# Patient Record
Sex: FEMALE | Race: WHITE | NOT HISPANIC OR LATINO | Employment: FULL TIME | URBAN - METROPOLITAN AREA
[De-identification: names, ages, dates, MRNs, and addresses within clinical notes are randomized per-mention and may not be internally consistent; named-entity substitution may affect disease eponyms.]

---

## 2022-05-03 ENCOUNTER — HOSPITAL ENCOUNTER (OUTPATIENT)
Facility: MEDICAL CENTER | Age: 37
End: 2022-05-03
Attending: OBSTETRICS & GYNECOLOGY
Payer: COMMERCIAL

## 2022-05-03 PROCEDURE — 88175 CYTOPATH C/V AUTO FLUID REDO: CPT

## 2022-05-03 PROCEDURE — 87591 N.GONORRHOEAE DNA AMP PROB: CPT

## 2022-05-03 PROCEDURE — 87491 CHLMYD TRACH DNA AMP PROBE: CPT

## 2022-05-05 LAB
C TRACH DNA GENITAL QL NAA+PROBE: NEGATIVE
CYTOLOGY REG CYTOL: NORMAL
N GONORRHOEA DNA GENITAL QL NAA+PROBE: NEGATIVE
SPECIMEN SOURCE: NORMAL

## 2022-05-13 ENCOUNTER — HOSPITAL ENCOUNTER (OUTPATIENT)
Facility: MEDICAL CENTER | Age: 37
End: 2022-05-13
Attending: OBSTETRICS & GYNECOLOGY
Payer: COMMERCIAL

## 2022-05-13 PROCEDURE — 87086 URINE CULTURE/COLONY COUNT: CPT

## 2022-05-13 PROCEDURE — 87389 HIV-1 AG W/HIV-1&-2 AB AG IA: CPT

## 2022-05-13 PROCEDURE — 82570 ASSAY OF URINE CREATININE: CPT

## 2022-05-13 PROCEDURE — 86762 RUBELLA ANTIBODY: CPT

## 2022-05-13 PROCEDURE — 84156 ASSAY OF PROTEIN URINE: CPT

## 2022-05-13 PROCEDURE — 86850 RBC ANTIBODY SCREEN: CPT

## 2022-05-13 PROCEDURE — 86780 TREPONEMA PALLIDUM: CPT

## 2022-05-13 PROCEDURE — 36415 COLL VENOUS BLD VENIPUNCTURE: CPT

## 2022-05-13 PROCEDURE — 80053 COMPREHEN METABOLIC PANEL: CPT

## 2022-05-13 PROCEDURE — 86900 BLOOD TYPING SEROLOGIC ABO: CPT

## 2022-05-13 PROCEDURE — 86803 HEPATITIS C AB TEST: CPT

## 2022-05-13 PROCEDURE — 86901 BLOOD TYPING SEROLOGIC RH(D): CPT

## 2022-05-13 PROCEDURE — 87340 HEPATITIS B SURFACE AG IA: CPT

## 2022-05-13 PROCEDURE — 86886 COOMBS TEST INDIRECT TITER: CPT

## 2022-05-16 LAB
ABO GROUP BLD: NORMAL
ALBUMIN SERPL BCP-MCNC: 4.1 G/DL (ref 3.2–4.9)
ALBUMIN/GLOB SERPL: 1.2 G/DL
ALP SERPL-CCNC: 142 U/L (ref 30–99)
ALT SERPL-CCNC: 279 U/L (ref 2–50)
ANION GAP SERPL CALC-SCNC: 15 MMOL/L (ref 7–16)
AST SERPL-CCNC: 139 U/L (ref 12–45)
BILIRUB SERPL-MCNC: 0.3 MG/DL (ref 0.1–1.5)
BLD GP AB SCN SERPL QL: NORMAL
BUN SERPL-MCNC: 8 MG/DL (ref 8–22)
CALCIUM SERPL-MCNC: 9.7 MG/DL (ref 8.5–10.5)
CHLORIDE SERPL-SCNC: 101 MMOL/L (ref 96–112)
CO2 SERPL-SCNC: 21 MMOL/L (ref 20–33)
CREAT SERPL-MCNC: 0.7 MG/DL (ref 0.5–1.4)
CREAT UR-MCNC: 8.13 MG/DL
GFR SERPLBLD CREATININE-BSD FMLA CKD-EPI: 113 ML/MIN/1.73 M 2
GLOBULIN SER CALC-MCNC: 3.5 G/DL (ref 1.9–3.5)
GLUCOSE SERPL-MCNC: 50 MG/DL (ref 65–99)
HBV SURFACE AG SER QL: NORMAL
HCV AB SER QL: NORMAL
HIV 1+2 AB+HIV1 P24 AG SERPL QL IA: NORMAL
POTASSIUM SERPL-SCNC: 4.1 MMOL/L (ref 3.6–5.5)
PROT SERPL-MCNC: 7.6 G/DL (ref 6–8.2)
PROT UR-MCNC: <4 MG/DL (ref 0–15)
PROT/CREAT UR: NORMAL MG/G (ref 10–107)
RH BLD: NORMAL
RUBV AB SER QL: 284 IU/ML
SODIUM SERPL-SCNC: 137 MMOL/L (ref 135–145)
T PALLIDUM AB SER QL IA: NORMAL

## 2022-05-18 LAB
BACTERIA UR CULT: NORMAL
SIGNIFICANT IND 70042: NORMAL
SITE SITE: NORMAL
SOURCE SOURCE: NORMAL

## 2022-06-24 ENCOUNTER — HOSPITAL ENCOUNTER (OUTPATIENT)
Dept: LAB | Facility: MEDICAL CENTER | Age: 37
End: 2022-06-24
Attending: OBSTETRICS & GYNECOLOGY
Payer: COMMERCIAL

## 2022-06-24 LAB
ALBUMIN SERPL BCP-MCNC: 3.8 G/DL (ref 3.2–4.9)
ALBUMIN/GLOB SERPL: 1.3 G/DL
ALP SERPL-CCNC: 37 U/L (ref 30–99)
ALT SERPL-CCNC: 28 U/L (ref 2–50)
ANION GAP SERPL CALC-SCNC: 13 MMOL/L (ref 7–16)
AST SERPL-CCNC: 23 U/L (ref 12–45)
BILIRUB SERPL-MCNC: 0.2 MG/DL (ref 0.1–1.5)
BUN SERPL-MCNC: 11 MG/DL (ref 8–22)
CALCIUM SERPL-MCNC: 9.2 MG/DL (ref 8.5–10.5)
CHLORIDE SERPL-SCNC: 103 MMOL/L (ref 96–112)
CO2 SERPL-SCNC: 21 MMOL/L (ref 20–33)
CREAT SERPL-MCNC: 0.64 MG/DL (ref 0.5–1.4)
GFR SERPLBLD CREATININE-BSD FMLA CKD-EPI: 116 ML/MIN/1.73 M 2
GLOBULIN SER CALC-MCNC: 3 G/DL (ref 1.9–3.5)
GLUCOSE SERPL-MCNC: 86 MG/DL (ref 65–99)
POTASSIUM SERPL-SCNC: 3.6 MMOL/L (ref 3.6–5.5)
PROT SERPL-MCNC: 6.8 G/DL (ref 6–8.2)
SODIUM SERPL-SCNC: 137 MMOL/L (ref 135–145)

## 2022-06-24 PROCEDURE — 36415 COLL VENOUS BLD VENIPUNCTURE: CPT

## 2022-06-24 PROCEDURE — 80053 COMPREHEN METABOLIC PANEL: CPT

## 2022-06-30 ENCOUNTER — HOSPITAL ENCOUNTER (OUTPATIENT)
Facility: MEDICAL CENTER | Age: 37
End: 2022-06-30
Attending: OBSTETRICS & GYNECOLOGY
Payer: COMMERCIAL

## 2022-06-30 PROCEDURE — 82105 ALPHA-FETOPROTEIN SERUM: CPT

## 2022-06-30 PROCEDURE — 36415 COLL VENOUS BLD VENIPUNCTURE: CPT

## 2022-07-03 LAB
# FETUSES US: NORMAL
AFP MOM SERPL: 0.91
AFP SERPL-MCNC: 31 NG/ML
AGE - REPORTED: 38.8 YR
CURRENT SMOKER: NO
FAMILY MEMBER DISEASES HX: NO
GA METHOD: NORMAL
GA: NORMAL WK
IDDM PATIENT QL: NO
INTEGRATED SCN PATIENT-IMP: NORMAL
SPECIMEN DRAWN SERPL: NORMAL

## 2022-08-16 ENCOUNTER — HOSPITAL ENCOUNTER (OUTPATIENT)
Dept: LAB | Facility: MEDICAL CENTER | Age: 37
End: 2022-08-16
Attending: OBSTETRICS & GYNECOLOGY
Payer: COMMERCIAL

## 2022-08-16 LAB
GLUCOSE 1H P 50 G GLC PO SERPL-MCNC: 148 MG/DL (ref 70–139)
HCT VFR BLD AUTO: 36.2 % (ref 37–47)
HGB BLD-MCNC: 12.4 G/DL (ref 12–16)
PLATELET # BLD AUTO: 194 K/UL (ref 164–446)
T PALLIDUM AB SER QL IA: NORMAL

## 2022-08-16 PROCEDURE — 86780 TREPONEMA PALLIDUM: CPT

## 2022-08-16 PROCEDURE — 85014 HEMATOCRIT: CPT

## 2022-08-16 PROCEDURE — 82950 GLUCOSE TEST: CPT

## 2022-08-16 PROCEDURE — 36415 COLL VENOUS BLD VENIPUNCTURE: CPT

## 2022-08-16 PROCEDURE — 85049 AUTOMATED PLATELET COUNT: CPT

## 2022-08-16 PROCEDURE — 85018 HEMOGLOBIN: CPT

## 2022-08-31 ENCOUNTER — HOSPITAL ENCOUNTER (OUTPATIENT)
Dept: LAB | Facility: MEDICAL CENTER | Age: 37
End: 2022-08-31
Attending: OBSTETRICS & GYNECOLOGY
Payer: COMMERCIAL

## 2022-08-31 LAB
GLUCOSE 1H P CHAL SERPL-MCNC: 141 MG/DL (ref 65–180)
GLUCOSE 2H P CHAL SERPL-MCNC: 129 MG/DL (ref 65–155)
GLUCOSE 3H P CHAL SERPL-MCNC: 101 MG/DL (ref 65–140)
GLUCOSE BS SERPL-MCNC: 87 MG/DL (ref 65–95)

## 2022-08-31 PROCEDURE — 82951 GLUCOSE TOLERANCE TEST (GTT): CPT

## 2022-08-31 PROCEDURE — 36415 COLL VENOUS BLD VENIPUNCTURE: CPT

## 2022-08-31 PROCEDURE — 82952 GTT-ADDED SAMPLES: CPT

## 2022-10-20 ENCOUNTER — HOSPITAL ENCOUNTER (OUTPATIENT)
Dept: LAB | Facility: MEDICAL CENTER | Age: 37
End: 2022-10-20
Attending: OBSTETRICS & GYNECOLOGY

## 2022-10-20 ENCOUNTER — HOSPITAL ENCOUNTER (OUTPATIENT)
Facility: MEDICAL CENTER | Age: 37
End: 2022-10-20
Attending: OBSTETRICS & GYNECOLOGY
Payer: COMMERCIAL

## 2022-10-20 PROCEDURE — 87150 DNA/RNA AMPLIFIED PROBE: CPT

## 2022-10-20 PROCEDURE — 87081 CULTURE SCREEN ONLY: CPT

## 2022-10-21 LAB — GP B STREP DNA SPEC QL NAA+PROBE: NEGATIVE

## 2022-11-26 ENCOUNTER — ANESTHESIA EVENT (OUTPATIENT)
Dept: OBGYN | Facility: MEDICAL CENTER | Age: 37
End: 2022-11-26
Payer: COMMERCIAL

## 2022-11-26 ENCOUNTER — ANESTHESIA (OUTPATIENT)
Dept: OBGYN | Facility: MEDICAL CENTER | Age: 37
End: 2022-11-26
Payer: COMMERCIAL

## 2022-11-26 ENCOUNTER — APPOINTMENT (OUTPATIENT)
Dept: OBGYN | Facility: MEDICAL CENTER | Age: 37
End: 2022-11-26
Attending: OBSTETRICS & GYNECOLOGY
Payer: COMMERCIAL

## 2022-11-26 ENCOUNTER — APPOINTMENT (OUTPATIENT)
Dept: RADIOLOGY | Facility: MEDICAL CENTER | Age: 37
End: 2022-11-26
Attending: OBSTETRICS & GYNECOLOGY
Payer: COMMERCIAL

## 2022-11-26 ENCOUNTER — HOSPITAL ENCOUNTER (INPATIENT)
Facility: MEDICAL CENTER | Age: 37
LOS: 2 days | End: 2022-11-28
Attending: OBSTETRICS & GYNECOLOGY | Admitting: OBSTETRICS & GYNECOLOGY
Payer: COMMERCIAL

## 2022-11-26 DIAGNOSIS — G89.18 POST-OP PAIN: ICD-10-CM

## 2022-11-26 DIAGNOSIS — Z91.89 AT RISK FOR BREASTFEEDING DIFFICULTY: Primary | ICD-10-CM

## 2022-11-26 LAB
ALBUMIN SERPL BCP-MCNC: 3.5 G/DL (ref 3.2–4.9)
ALBUMIN/GLOB SERPL: 1.1 G/DL
ALP SERPL-CCNC: 73 U/L (ref 30–99)
ALT SERPL-CCNC: 18 U/L (ref 2–50)
ANION GAP SERPL CALC-SCNC: 13 MMOL/L (ref 7–16)
APTT PPP: 26.7 SEC (ref 24.7–36)
AST SERPL-CCNC: 32 U/L (ref 12–45)
BASOPHILS # BLD AUTO: 0.4 % (ref 0–1.8)
BASOPHILS # BLD: 0.03 K/UL (ref 0–0.12)
BILIRUB SERPL-MCNC: 0.2 MG/DL (ref 0.1–1.5)
BUN SERPL-MCNC: 10 MG/DL (ref 8–22)
CALCIUM SERPL-MCNC: 9.1 MG/DL (ref 8.5–10.5)
CHLORIDE SERPL-SCNC: 103 MMOL/L (ref 96–112)
CO2 SERPL-SCNC: 21 MMOL/L (ref 20–33)
CREAT SERPL-MCNC: 0.68 MG/DL (ref 0.5–1.4)
CREAT UR-MCNC: 45.38 MG/DL
EOSINOPHIL # BLD AUTO: 0.03 K/UL (ref 0–0.51)
EOSINOPHIL NFR BLD: 0.4 % (ref 0–6.9)
ERYTHROCYTE [DISTWIDTH] IN BLOOD BY AUTOMATED COUNT: 45.5 FL (ref 35.9–50)
GFR SERPLBLD CREATININE-BSD FMLA CKD-EPI: 114 ML/MIN/1.73 M 2
GLOBULIN SER CALC-MCNC: 3.2 G/DL (ref 1.9–3.5)
GLUCOSE SERPL-MCNC: 109 MG/DL (ref 65–99)
HCT VFR BLD AUTO: 40.4 % (ref 37–47)
HGB BLD-MCNC: 13.6 G/DL (ref 12–16)
HOLDING TUBE BB 8507: NORMAL
IMM GRANULOCYTES # BLD AUTO: 0.03 K/UL (ref 0–0.11)
IMM GRANULOCYTES NFR BLD AUTO: 0.4 % (ref 0–0.9)
INR PPP: 0.9 (ref 0.87–1.13)
LYMPHOCYTES # BLD AUTO: 1.25 K/UL (ref 1–4.8)
LYMPHOCYTES NFR BLD: 18 % (ref 22–41)
MCH RBC QN AUTO: 30.7 PG (ref 27–33)
MCHC RBC AUTO-ENTMCNC: 33.7 G/DL (ref 33.6–35)
MCV RBC AUTO: 91.2 FL (ref 81.4–97.8)
MONOCYTES # BLD AUTO: 0.56 K/UL (ref 0–0.85)
MONOCYTES NFR BLD AUTO: 8.1 % (ref 0–13.4)
NEUTROPHILS # BLD AUTO: 5.03 K/UL (ref 2–7.15)
NEUTROPHILS NFR BLD: 72.7 % (ref 44–72)
NRBC # BLD AUTO: 0 K/UL
NRBC BLD-RTO: 0 /100 WBC
PLATELET # BLD AUTO: 148 K/UL (ref 164–446)
PMV BLD AUTO: 13.1 FL (ref 9–12.9)
POTASSIUM SERPL-SCNC: 4.2 MMOL/L (ref 3.6–5.5)
PROT SERPL-MCNC: 6.7 G/DL (ref 6–8.2)
PROT UR-MCNC: 8 MG/DL (ref 0–15)
PROT/CREAT UR: 176 MG/G (ref 10–107)
PROTHROMBIN TIME: 12.1 SEC (ref 12–14.6)
RBC # BLD AUTO: 4.43 M/UL (ref 4.2–5.4)
SODIUM SERPL-SCNC: 137 MMOL/L (ref 135–145)
T PALLIDUM AB SER QL IA: NORMAL
URATE SERPL-MCNC: 5 MG/DL (ref 1.9–8.2)
WBC # BLD AUTO: 6.9 K/UL (ref 4.8–10.8)

## 2022-11-26 PROCEDURE — 85610 PROTHROMBIN TIME: CPT

## 2022-11-26 PROCEDURE — 160035 HCHG PACU - 1ST 60 MINS PHASE I: Performed by: OBSTETRICS & GYNECOLOGY

## 2022-11-26 PROCEDURE — 700111 HCHG RX REV CODE 636 W/ 250 OVERRIDE (IP)

## 2022-11-26 PROCEDURE — 700101 HCHG RX REV CODE 250: Performed by: ANESTHESIOLOGY

## 2022-11-26 PROCEDURE — 36415 COLL VENOUS BLD VENIPUNCTURE: CPT

## 2022-11-26 PROCEDURE — 84550 ASSAY OF BLOOD/URIC ACID: CPT

## 2022-11-26 PROCEDURE — 160048 HCHG OR STATISTICAL LEVEL 1-5: Performed by: OBSTETRICS & GYNECOLOGY

## 2022-11-26 PROCEDURE — 160036 HCHG PACU - EA ADDL 30 MINS PHASE I: Performed by: OBSTETRICS & GYNECOLOGY

## 2022-11-26 PROCEDURE — A9270 NON-COVERED ITEM OR SERVICE: HCPCS | Performed by: ANESTHESIOLOGY

## 2022-11-26 PROCEDURE — A9270 NON-COVERED ITEM OR SERVICE: HCPCS | Performed by: OBSTETRICS & GYNECOLOGY

## 2022-11-26 PROCEDURE — 700105 HCHG RX REV CODE 258: Performed by: ANESTHESIOLOGY

## 2022-11-26 PROCEDURE — C1755 CATHETER, INTRASPINAL: HCPCS | Performed by: OBSTETRICS & GYNECOLOGY

## 2022-11-26 PROCEDURE — 700101 HCHG RX REV CODE 250

## 2022-11-26 PROCEDURE — 160041 HCHG SURGERY MINUTES - EA ADDL 1 MIN LEVEL 4: Performed by: OBSTETRICS & GYNECOLOGY

## 2022-11-26 PROCEDURE — 160002 HCHG RECOVERY MINUTES (STAT): Performed by: OBSTETRICS & GYNECOLOGY

## 2022-11-26 PROCEDURE — 160029 HCHG SURGERY MINUTES - 1ST 30 MINS LEVEL 4: Performed by: OBSTETRICS & GYNECOLOGY

## 2022-11-26 PROCEDURE — 700102 HCHG RX REV CODE 250 W/ 637 OVERRIDE(OP): Performed by: ANESTHESIOLOGY

## 2022-11-26 PROCEDURE — 700111 HCHG RX REV CODE 636 W/ 250 OVERRIDE (IP): Performed by: ANESTHESIOLOGY

## 2022-11-26 PROCEDURE — 86780 TREPONEMA PALLIDUM: CPT

## 2022-11-26 PROCEDURE — 84156 ASSAY OF PROTEIN URINE: CPT

## 2022-11-26 PROCEDURE — 700102 HCHG RX REV CODE 250 W/ 637 OVERRIDE(OP): Performed by: OBSTETRICS & GYNECOLOGY

## 2022-11-26 PROCEDURE — 80053 COMPREHEN METABOLIC PANEL: CPT

## 2022-11-26 PROCEDURE — 85025 COMPLETE CBC W/AUTO DIFF WBC: CPT

## 2022-11-26 PROCEDURE — 770002 HCHG ROOM/CARE - OB PRIVATE (112)

## 2022-11-26 PROCEDURE — 82570 ASSAY OF URINE CREATININE: CPT

## 2022-11-26 PROCEDURE — 01961 ANES CESAREAN DELIVERY ONLY: CPT | Performed by: ANESTHESIOLOGY

## 2022-11-26 PROCEDURE — 85730 THROMBOPLASTIN TIME PARTIAL: CPT

## 2022-11-26 PROCEDURE — 3E0P7VZ INTRODUCTION OF HORMONE INTO FEMALE REPRODUCTIVE, VIA NATURAL OR ARTIFICIAL OPENING: ICD-10-PCS | Performed by: OBSTETRICS & GYNECOLOGY

## 2022-11-26 PROCEDURE — 700111 HCHG RX REV CODE 636 W/ 250 OVERRIDE (IP): Performed by: OBSTETRICS & GYNECOLOGY

## 2022-11-26 PROCEDURE — 160009 HCHG ANES TIME/MIN: Performed by: OBSTETRICS & GYNECOLOGY

## 2022-11-26 RX ORDER — OXYCODONE HYDROCHLORIDE 10 MG/1
10 TABLET ORAL EVERY 4 HOURS PRN
Status: DISCONTINUED | OUTPATIENT
Start: 2022-11-27 | End: 2022-11-28 | Stop reason: HOSPADM

## 2022-11-26 RX ORDER — NIFEDIPINE 10 MG/1
10 CAPSULE ORAL
Status: DISCONTINUED | OUTPATIENT
Start: 2022-11-26 | End: 2022-11-26 | Stop reason: HOSPADM

## 2022-11-26 RX ORDER — MORPHINE SULFATE 0.5 MG/ML
INJECTION, SOLUTION EPIDURAL; INTRATHECAL; INTRAVENOUS PRN
Status: DISCONTINUED | OUTPATIENT
Start: 2022-11-26 | End: 2022-11-26 | Stop reason: SURG

## 2022-11-26 RX ORDER — ENOXAPARIN SODIUM 100 MG/ML
INJECTION SUBCUTANEOUS
COMMUNITY
Start: 2022-11-25

## 2022-11-26 RX ORDER — ACYCLOVIR 200 MG/1
400 CAPSULE ORAL DAILY
Status: COMPLETED | OUTPATIENT
Start: 2022-11-26 | End: 2022-11-28

## 2022-11-26 RX ORDER — DIPHENHYDRAMINE HCL 25 MG
25 TABLET ORAL EVERY 6 HOURS PRN
Status: DISCONTINUED | OUTPATIENT
Start: 2022-11-27 | End: 2022-11-28 | Stop reason: HOSPADM

## 2022-11-26 RX ORDER — AZITHROMYCIN 500 MG/5ML
500 INJECTION, POWDER, LYOPHILIZED, FOR SOLUTION INTRAVENOUS ONCE
Status: DISCONTINUED | OUTPATIENT
Start: 2022-11-26 | End: 2022-11-26

## 2022-11-26 RX ORDER — DIPHENHYDRAMINE HYDROCHLORIDE 50 MG/ML
25 INJECTION INTRAMUSCULAR; INTRAVENOUS EVERY 6 HOURS PRN
Status: ACTIVE | OUTPATIENT
Start: 2022-11-26 | End: 2022-11-27

## 2022-11-26 RX ORDER — HYDROMORPHONE HYDROCHLORIDE 1 MG/ML
0.4 INJECTION, SOLUTION INTRAMUSCULAR; INTRAVENOUS; SUBCUTANEOUS
Status: ACTIVE | OUTPATIENT
Start: 2022-11-26 | End: 2022-11-27

## 2022-11-26 RX ORDER — SODIUM CHLORIDE, SODIUM LACTATE, POTASSIUM CHLORIDE, CALCIUM CHLORIDE 600; 310; 30; 20 MG/100ML; MG/100ML; MG/100ML; MG/100ML
INJECTION, SOLUTION INTRAVENOUS CONTINUOUS
Status: DISCONTINUED | OUTPATIENT
Start: 2022-11-26 | End: 2022-11-27

## 2022-11-26 RX ORDER — SIMETHICONE 125 MG
125 TABLET,CHEWABLE ORAL 4 TIMES DAILY PRN
Status: DISCONTINUED | OUTPATIENT
Start: 2022-11-26 | End: 2022-11-28 | Stop reason: HOSPADM

## 2022-11-26 RX ORDER — VITAMIN A ACETATE, BETA CAROTENE, ASCORBIC ACID, CHOLECALCIFEROL, .ALPHA.-TOCOPHEROL ACETATE, DL-, THIAMINE MONONITRATE, RIBOFLAVIN, NIACINAMIDE, PYRIDOXINE HYDROCHLORIDE, FOLIC ACID, CYANOCOBALAMIN, CALCIUM CARBONATE, FERROUS FUMARATE, ZINC OXIDE, CUPRIC OXIDE 3080; 12; 120; 400; 1; 1.84; 3; 20; 22; 920; 25; 200; 27; 10; 2 [IU]/1; UG/1; MG/1; [IU]/1; MG/1; MG/1; MG/1; MG/1; MG/1; [IU]/1; MG/1; MG/1; MG/1; MG/1; MG/1
1 TABLET, FILM COATED ORAL
Status: DISCONTINUED | OUTPATIENT
Start: 2022-11-27 | End: 2022-11-28 | Stop reason: HOSPADM

## 2022-11-26 RX ORDER — LABETALOL HYDROCHLORIDE 5 MG/ML
20-80 INJECTION, SOLUTION INTRAVENOUS
Status: DISCONTINUED | OUTPATIENT
Start: 2022-11-26 | End: 2022-11-26 | Stop reason: HOSPADM

## 2022-11-26 RX ORDER — CALCIUM CARBONATE 500 MG/1
1000 TABLET, CHEWABLE ORAL EVERY 6 HOURS PRN
Status: DISCONTINUED | OUTPATIENT
Start: 2022-11-26 | End: 2022-11-28 | Stop reason: HOSPADM

## 2022-11-26 RX ORDER — LABETALOL HYDROCHLORIDE 5 MG/ML
INJECTION, SOLUTION INTRAVENOUS
Status: COMPLETED
Start: 2022-11-26 | End: 2022-11-26

## 2022-11-26 RX ORDER — CEFAZOLIN SODIUM 1 G/3ML
INJECTION, POWDER, FOR SOLUTION INTRAMUSCULAR; INTRAVENOUS PRN
Status: DISCONTINUED | OUTPATIENT
Start: 2022-11-26 | End: 2022-11-26 | Stop reason: SURG

## 2022-11-26 RX ORDER — ENOXAPARIN SODIUM 100 MG/ML
40 INJECTION SUBCUTANEOUS DAILY
Status: DISCONTINUED | OUTPATIENT
Start: 2022-11-27 | End: 2022-11-28 | Stop reason: HOSPADM

## 2022-11-26 RX ORDER — LABETALOL 100 MG/1
200 TABLET, FILM COATED ORAL ONCE
Status: COMPLETED | OUTPATIENT
Start: 2022-11-26 | End: 2022-11-26

## 2022-11-26 RX ORDER — SODIUM CHLORIDE, SODIUM GLUCONATE, SODIUM ACETATE, POTASSIUM CHLORIDE AND MAGNESIUM CHLORIDE 526; 502; 368; 37; 30 MG/100ML; MG/100ML; MG/100ML; MG/100ML; MG/100ML
INJECTION, SOLUTION INTRAVENOUS
Status: DISCONTINUED | OUTPATIENT
Start: 2022-11-26 | End: 2022-11-26 | Stop reason: SURG

## 2022-11-26 RX ORDER — IBUPROFEN 800 MG/1
800 TABLET ORAL EVERY 8 HOURS PRN
Status: DISCONTINUED | OUTPATIENT
Start: 2022-11-30 | End: 2022-11-28 | Stop reason: HOSPADM

## 2022-11-26 RX ORDER — OXYCODONE HCL 5 MG/5 ML
10 SOLUTION, ORAL ORAL
Status: DISCONTINUED | OUTPATIENT
Start: 2022-11-26 | End: 2022-11-26 | Stop reason: HOSPADM

## 2022-11-26 RX ORDER — MEPERIDINE HYDROCHLORIDE 25 MG/ML
12.5 INJECTION INTRAMUSCULAR; INTRAVENOUS; SUBCUTANEOUS
Status: DISCONTINUED | OUTPATIENT
Start: 2022-11-26 | End: 2022-11-26 | Stop reason: HOSPADM

## 2022-11-26 RX ORDER — KETOROLAC TROMETHAMINE 30 MG/ML
30 INJECTION, SOLUTION INTRAMUSCULAR; INTRAVENOUS EVERY 6 HOURS
Status: COMPLETED | OUTPATIENT
Start: 2022-11-26 | End: 2022-11-27

## 2022-11-26 RX ORDER — OXYCODONE HCL 5 MG/5 ML
5 SOLUTION, ORAL ORAL
Status: DISCONTINUED | OUTPATIENT
Start: 2022-11-26 | End: 2022-11-26 | Stop reason: HOSPADM

## 2022-11-26 RX ORDER — HYDROMORPHONE HYDROCHLORIDE 1 MG/ML
0.2 INJECTION, SOLUTION INTRAMUSCULAR; INTRAVENOUS; SUBCUTANEOUS
Status: ACTIVE | OUTPATIENT
Start: 2022-11-26 | End: 2022-11-27

## 2022-11-26 RX ORDER — DIPHENHYDRAMINE HYDROCHLORIDE 50 MG/ML
12.5 INJECTION INTRAMUSCULAR; INTRAVENOUS EVERY 6 HOURS PRN
Status: ACTIVE | OUTPATIENT
Start: 2022-11-26 | End: 2022-11-27

## 2022-11-26 RX ORDER — TERBUTALINE SULFATE 1 MG/ML
0.25 INJECTION, SOLUTION SUBCUTANEOUS
Status: DISCONTINUED | OUTPATIENT
Start: 2022-11-26 | End: 2022-11-26 | Stop reason: HOSPADM

## 2022-11-26 RX ORDER — ACETAMINOPHEN 500 MG
1000 TABLET ORAL
Status: COMPLETED | OUTPATIENT
Start: 2022-11-26 | End: 2022-11-26

## 2022-11-26 RX ORDER — HALOPERIDOL 5 MG/ML
1 INJECTION INTRAMUSCULAR
Status: DISCONTINUED | OUTPATIENT
Start: 2022-11-26 | End: 2022-11-26 | Stop reason: HOSPADM

## 2022-11-26 RX ORDER — DIPHENHYDRAMINE HYDROCHLORIDE 50 MG/ML
25 INJECTION INTRAMUSCULAR; INTRAVENOUS EVERY 6 HOURS PRN
Status: DISCONTINUED | OUTPATIENT
Start: 2022-11-27 | End: 2022-11-28 | Stop reason: HOSPADM

## 2022-11-26 RX ORDER — SODIUM CHLORIDE, SODIUM GLUCONATE, SODIUM ACETATE, POTASSIUM CHLORIDE AND MAGNESIUM CHLORIDE 526; 502; 368; 37; 30 MG/100ML; MG/100ML; MG/100ML; MG/100ML; MG/100ML
1000 INJECTION, SOLUTION INTRAVENOUS ONCE
Status: DISCONTINUED | OUTPATIENT
Start: 2022-11-26 | End: 2022-11-26 | Stop reason: HOSPADM

## 2022-11-26 RX ORDER — VALACYCLOVIR HYDROCHLORIDE 500 MG/1
TABLET, FILM COATED ORAL
COMMUNITY
Start: 2009-09-24

## 2022-11-26 RX ORDER — ONDANSETRON 2 MG/ML
4 INJECTION INTRAMUSCULAR; INTRAVENOUS
Status: DISCONTINUED | OUTPATIENT
Start: 2022-11-26 | End: 2022-11-26 | Stop reason: HOSPADM

## 2022-11-26 RX ORDER — ACETAMINOPHEN 500 MG
1000 TABLET ORAL EVERY 6 HOURS PRN
Status: DISCONTINUED | OUTPATIENT
Start: 2022-12-01 | End: 2022-11-28 | Stop reason: HOSPADM

## 2022-11-26 RX ORDER — BUPIVACAINE HYDROCHLORIDE 7.5 MG/ML
INJECTION, SOLUTION INTRASPINAL PRN
Status: DISCONTINUED | OUTPATIENT
Start: 2022-11-26 | End: 2022-11-26 | Stop reason: SURG

## 2022-11-26 RX ORDER — ACETAMINOPHEN 500 MG
1000 TABLET ORAL EVERY 6 HOURS
Status: COMPLETED | OUTPATIENT
Start: 2022-11-26 | End: 2022-11-27

## 2022-11-26 RX ORDER — OXYCODONE HYDROCHLORIDE 5 MG/1
5 TABLET ORAL EVERY 4 HOURS PRN
Status: DISPENSED | OUTPATIENT
Start: 2022-11-26 | End: 2022-11-27

## 2022-11-26 RX ORDER — NIFEDIPINE 10 MG/1
10 CAPSULE ORAL EVERY 8 HOURS
Status: DISCONTINUED | OUTPATIENT
Start: 2022-11-26 | End: 2022-11-28 | Stop reason: HOSPADM

## 2022-11-26 RX ORDER — NIFEDIPINE 10 MG/1
10 CAPSULE ORAL
Status: DISCONTINUED | OUTPATIENT
Start: 2022-11-26 | End: 2022-11-28 | Stop reason: HOSPADM

## 2022-11-26 RX ORDER — SODIUM CHLORIDE, SODIUM LACTATE, POTASSIUM CHLORIDE, CALCIUM CHLORIDE 600; 310; 30; 20 MG/100ML; MG/100ML; MG/100ML; MG/100ML
INJECTION, SOLUTION INTRAVENOUS PRN
Status: DISCONTINUED | OUTPATIENT
Start: 2022-11-26 | End: 2022-11-28 | Stop reason: HOSPADM

## 2022-11-26 RX ORDER — SODIUM CHLORIDE, SODIUM LACTATE, POTASSIUM CHLORIDE, CALCIUM CHLORIDE 600; 310; 30; 20 MG/100ML; MG/100ML; MG/100ML; MG/100ML
INJECTION, SOLUTION INTRAVENOUS CONTINUOUS
Status: DISCONTINUED | OUTPATIENT
Start: 2022-11-26 | End: 2022-11-28 | Stop reason: HOSPADM

## 2022-11-26 RX ORDER — IBUPROFEN 800 MG/1
800 TABLET ORAL
Status: DISCONTINUED | OUTPATIENT
Start: 2022-11-26 | End: 2022-11-26 | Stop reason: HOSPADM

## 2022-11-26 RX ORDER — HYDRALAZINE HYDROCHLORIDE 20 MG/ML
5-10 INJECTION INTRAMUSCULAR; INTRAVENOUS
Status: DISCONTINUED | OUTPATIENT
Start: 2022-11-26 | End: 2022-11-26 | Stop reason: HOSPADM

## 2022-11-26 RX ORDER — HYDROMORPHONE HYDROCHLORIDE 1 MG/ML
0.1 INJECTION, SOLUTION INTRAMUSCULAR; INTRAVENOUS; SUBCUTANEOUS
Status: DISCONTINUED | OUTPATIENT
Start: 2022-11-26 | End: 2022-11-26 | Stop reason: HOSPADM

## 2022-11-26 RX ORDER — DOCUSATE SODIUM 100 MG/1
100 CAPSULE, LIQUID FILLED ORAL 2 TIMES DAILY PRN
Status: DISCONTINUED | OUTPATIENT
Start: 2022-11-26 | End: 2022-11-28 | Stop reason: HOSPADM

## 2022-11-26 RX ORDER — OXYTOCIN 10 [USP'U]/ML
10 INJECTION, SOLUTION INTRAMUSCULAR; INTRAVENOUS
Status: DISCONTINUED | OUTPATIENT
Start: 2022-11-26 | End: 2022-11-26 | Stop reason: HOSPADM

## 2022-11-26 RX ORDER — LABETALOL 100 MG/1
200 TABLET, FILM COATED ORAL TWICE DAILY
Status: DISCONTINUED | OUTPATIENT
Start: 2022-11-26 | End: 2022-11-28 | Stop reason: HOSPADM

## 2022-11-26 RX ORDER — BISACODYL 10 MG
10 SUPPOSITORY, RECTAL RECTAL PRN
Status: DISCONTINUED | OUTPATIENT
Start: 2022-11-26 | End: 2022-11-28 | Stop reason: HOSPADM

## 2022-11-26 RX ORDER — ONDANSETRON 2 MG/ML
4 INJECTION INTRAMUSCULAR; INTRAVENOUS EVERY 6 HOURS PRN
Status: DISCONTINUED | OUTPATIENT
Start: 2022-11-27 | End: 2022-11-28 | Stop reason: HOSPADM

## 2022-11-26 RX ORDER — METOCLOPRAMIDE HYDROCHLORIDE 5 MG/ML
10 INJECTION INTRAMUSCULAR; INTRAVENOUS ONCE
Status: COMPLETED | OUTPATIENT
Start: 2022-11-26 | End: 2022-11-26

## 2022-11-26 RX ORDER — ONDANSETRON 4 MG/1
4 TABLET, ORALLY DISINTEGRATING ORAL EVERY 6 HOURS PRN
Status: DISCONTINUED | OUTPATIENT
Start: 2022-11-27 | End: 2022-11-28 | Stop reason: HOSPADM

## 2022-11-26 RX ORDER — HYDROMORPHONE HYDROCHLORIDE 1 MG/ML
0.2 INJECTION, SOLUTION INTRAMUSCULAR; INTRAVENOUS; SUBCUTANEOUS
Status: DISCONTINUED | OUTPATIENT
Start: 2022-11-26 | End: 2022-11-26 | Stop reason: HOSPADM

## 2022-11-26 RX ORDER — ONDANSETRON 2 MG/ML
4 INJECTION INTRAMUSCULAR; INTRAVENOUS EVERY 6 HOURS PRN
Status: DISPENSED | OUTPATIENT
Start: 2022-11-26 | End: 2022-11-27

## 2022-11-26 RX ORDER — LABETALOL 100 MG/1
100 TABLET, FILM COATED ORAL 2 TIMES DAILY
COMMUNITY
Start: 2022-09-26

## 2022-11-26 RX ORDER — ACETAMINOPHEN 500 MG
1000 TABLET ORAL EVERY 6 HOURS
Status: DISCONTINUED | OUTPATIENT
Start: 2022-11-27 | End: 2022-11-28 | Stop reason: HOSPADM

## 2022-11-26 RX ORDER — IBUPROFEN 800 MG/1
800 TABLET ORAL EVERY 8 HOURS
Status: DISCONTINUED | OUTPATIENT
Start: 2022-11-27 | End: 2022-11-28 | Stop reason: HOSPADM

## 2022-11-26 RX ORDER — OXYCODONE HYDROCHLORIDE 5 MG/1
5 TABLET ORAL EVERY 4 HOURS PRN
Status: DISCONTINUED | OUTPATIENT
Start: 2022-11-27 | End: 2022-11-28 | Stop reason: HOSPADM

## 2022-11-26 RX ORDER — DIPHENHYDRAMINE HYDROCHLORIDE 50 MG/ML
12.5 INJECTION INTRAMUSCULAR; INTRAVENOUS
Status: DISCONTINUED | OUTPATIENT
Start: 2022-11-26 | End: 2022-11-26 | Stop reason: HOSPADM

## 2022-11-26 RX ORDER — HYDROMORPHONE HYDROCHLORIDE 1 MG/ML
0.4 INJECTION, SOLUTION INTRAMUSCULAR; INTRAVENOUS; SUBCUTANEOUS
Status: DISCONTINUED | OUTPATIENT
Start: 2022-11-26 | End: 2022-11-26 | Stop reason: HOSPADM

## 2022-11-26 RX ORDER — OXYTOCIN 10 [USP'U]/ML
INJECTION, SOLUTION INTRAMUSCULAR; INTRAVENOUS PRN
Status: DISCONTINUED | OUTPATIENT
Start: 2022-11-26 | End: 2022-11-26 | Stop reason: SURG

## 2022-11-26 RX ORDER — OXYCODONE HYDROCHLORIDE 10 MG/1
10 TABLET ORAL EVERY 4 HOURS PRN
Status: ACTIVE | OUTPATIENT
Start: 2022-11-26 | End: 2022-11-27

## 2022-11-26 RX ORDER — LIDOCAINE HYDROCHLORIDE 10 MG/ML
20 INJECTION, SOLUTION INFILTRATION; PERINEURAL
Status: DISCONTINUED | OUTPATIENT
Start: 2022-11-26 | End: 2022-11-26 | Stop reason: HOSPADM

## 2022-11-26 RX ORDER — MIDAZOLAM HYDROCHLORIDE 1 MG/ML
1 INJECTION INTRAMUSCULAR; INTRAVENOUS
Status: DISCONTINUED | OUTPATIENT
Start: 2022-11-26 | End: 2022-11-26 | Stop reason: HOSPADM

## 2022-11-26 RX ORDER — CITRIC ACID/SODIUM CITRATE 334-500MG
30 SOLUTION, ORAL ORAL ONCE
Status: COMPLETED | OUTPATIENT
Start: 2022-11-26 | End: 2022-11-26

## 2022-11-26 RX ADMIN — HYDROMORPHONE HYDROCHLORIDE 0.2 MG: 1 INJECTION, SOLUTION INTRAMUSCULAR; INTRAVENOUS; SUBCUTANEOUS at 22:32

## 2022-11-26 RX ADMIN — NIFEDIPINE 10 MG: 10 CAPSULE ORAL at 19:12

## 2022-11-26 RX ADMIN — NIFEDIPINE 10 MG: 10 CAPSULE ORAL at 11:22

## 2022-11-26 RX ADMIN — LABETALOL HYDROCHLORIDE 20 MG: 5 INJECTION, SOLUTION INTRAVENOUS at 18:18

## 2022-11-26 RX ADMIN — BUPIVACAINE HYDROCHLORIDE IN DEXTROSE 15 MG: 7.5 INJECTION, SOLUTION SUBARACHNOID at 20:28

## 2022-11-26 RX ADMIN — MORPHINE SULFATE 0.15 MG: 0.5 INJECTION, SOLUTION EPIDURAL; INTRATHECAL; INTRAVENOUS at 20:28

## 2022-11-26 RX ADMIN — SODIUM CHLORIDE, SODIUM GLUCONATE, SODIUM ACETATE, POTASSIUM CHLORIDE AND MAGNESIUM CHLORIDE: 526; 502; 368; 37; 30 INJECTION, SOLUTION INTRAVENOUS at 20:13

## 2022-11-26 RX ADMIN — FAMOTIDINE 20 MG: 10 INJECTION, SOLUTION INTRAVENOUS at 19:51

## 2022-11-26 RX ADMIN — ACYCLOVIR 400 MG: 200 CAPSULE ORAL at 15:02

## 2022-11-26 RX ADMIN — CEFAZOLIN 3 G: 330 INJECTION, POWDER, FOR SOLUTION INTRAMUSCULAR; INTRAVENOUS at 20:33

## 2022-11-26 RX ADMIN — OXYTOCIN 125 ML/HR: 10 INJECTION, SOLUTION INTRAMUSCULAR; INTRAVENOUS at 22:59

## 2022-11-26 RX ADMIN — ACETAMINOPHEN 1000 MG: 500 TABLET ORAL at 16:31

## 2022-11-26 RX ADMIN — HYDRALAZINE HYDROCHLORIDE 10 MG: 20 INJECTION INTRAMUSCULAR; INTRAVENOUS at 19:39

## 2022-11-26 RX ADMIN — HYDROMORPHONE HYDROCHLORIDE 0.2 MG: 1 INJECTION, SOLUTION INTRAMUSCULAR; INTRAVENOUS; SUBCUTANEOUS at 21:56

## 2022-11-26 RX ADMIN — LABETALOL HYDROCHLORIDE 200 MG: 100 TABLET, FILM COATED ORAL at 09:45

## 2022-11-26 RX ADMIN — ACETAMINOPHEN 1000 MG: 500 TABLET ORAL at 23:35

## 2022-11-26 RX ADMIN — LABETALOL HYDROCHLORIDE 40 MG: 5 INJECTION, SOLUTION INTRAVENOUS at 18:38

## 2022-11-26 RX ADMIN — MISOPROSTOL 25 MCG: 100 TABLET ORAL at 11:24

## 2022-11-26 RX ADMIN — KETOROLAC TROMETHAMINE 30 MG: 30 INJECTION, SOLUTION INTRAMUSCULAR at 23:47

## 2022-11-26 RX ADMIN — METOCLOPRAMIDE 10 MG: 5 INJECTION, SOLUTION INTRAMUSCULAR; INTRAVENOUS at 19:50

## 2022-11-26 RX ADMIN — SODIUM CITRATE AND CITRIC ACID MONOHYDRATE 30 ML: 500; 334 SOLUTION ORAL at 19:50

## 2022-11-26 RX ADMIN — LABETALOL HYDROCHLORIDE 200 MG: 100 TABLET, FILM COATED ORAL at 22:04

## 2022-11-26 RX ADMIN — OXYTOCIN 20 UNITS: 10 INJECTION, SOLUTION INTRAMUSCULAR; INTRAVENOUS at 20:46

## 2022-11-26 RX ADMIN — MISOPROSTOL 25 MCG: 100 TABLET ORAL at 15:29

## 2022-11-26 RX ADMIN — PHENYLEPHRINE HYDROCHLORIDE 0.2 MCG/KG/MIN: 10 INJECTION INTRAVENOUS at 20:31

## 2022-11-26 ASSESSMENT — LIFESTYLE VARIABLES
ON A TYPICAL DAY WHEN YOU DRINK ALCOHOL HOW MANY DRINKS DO YOU HAVE: 0
HAVE PEOPLE ANNOYED YOU BY CRITICIZING YOUR DRINKING: NO
HOW MANY TIMES IN THE PAST YEAR HAVE YOU HAD 5 OR MORE DRINKS IN A DAY: 0
HAVE YOU EVER FELT YOU SHOULD CUT DOWN ON YOUR DRINKING: NO
EVER FELT BAD OR GUILTY ABOUT YOUR DRINKING: NO
TOTAL SCORE: 0
AVERAGE NUMBER OF DAYS PER WEEK YOU HAVE A DRINK CONTAINING ALCOHOL: 0
EVER HAD A DRINK FIRST THING IN THE MORNING TO STEADY YOUR NERVES TO GET RID OF A HANGOVER: NO
DOES PATIENT WANT TO STOP DRINKING: NO
TOTAL SCORE: 0
TOTAL SCORE: 0
ALCOHOL_USE: NO
CONSUMPTION TOTAL: NEGATIVE

## 2022-11-26 ASSESSMENT — PATIENT HEALTH QUESTIONNAIRE - PHQ9
1. LITTLE INTEREST OR PLEASURE IN DOING THINGS: NOT AT ALL
2. FEELING DOWN, DEPRESSED, IRRITABLE, OR HOPELESS: NOT AT ALL
SUM OF ALL RESPONSES TO PHQ9 QUESTIONS 1 AND 2: 0

## 2022-11-26 ASSESSMENT — PAIN DESCRIPTION - PAIN TYPE
TYPE: ACUTE PAIN
TYPE: ACUTE PAIN

## 2022-11-26 ASSESSMENT — PAIN SCALES - GENERAL: PAIN_LEVEL: 0

## 2022-11-26 ASSESSMENT — FIBROSIS 4 INDEX: FIB4 SCORE: 0.85

## 2022-11-26 NOTE — PROGRESS NOTES
S:  Pt with mild HA.  Feeling occasional contractions    O:   - 183  DBP 75 - 113 P 93  FHTs 130s CAt 1  [place]: Irritable  Cx: Ft/ 20/-3 posterior    A/P:  IUP at 39 2/7 weeks, IOL for hx of DVT/PE on :Lovenox, CHTN - fair   Labor:  second misoprostol placed.  REcheck in 4  hours  FWB' reassuring  CHTH:  Labs normal.  Continue to monitor closely.

## 2022-11-26 NOTE — PROGRESS NOTES
0800-  Admitted for Iol due to hx of DVT, PE, and CHTN. POC discussed, Severe range pressures noted and communicated to Dr. Mauricio, Orders received. No pain at this time    1100- Elevated pressures noted and communicated to provider. Orders received for Nifedipine.  1115- Strip Reviewed by MANISHA Bourne, QMP, cytotec placed.      1525- Dr. Mauricio At bedside, SVE: unchanged. Another dose of misoprostol placed by provider. Strip reviewed by MD, pressures communicated. Orders received.    1600- Provider in department, pressures communicated, orders received to treat if pressures become severe range.     1700- Report given to Marta DYER. Pt understands POC and care transferred.

## 2022-11-26 NOTE — H&P
DATE OF ADMISSION:  2022     ADMITTING DIAGNOSES:  1.  Intrauterine pregnancy at 39 and 2/7 weeks.  2.  History of DVT on Lovenox.  3.  Advanced maternal age.  4.  Obesity.  5.  Chronic hypertension.  6.  History of HSV with no outbreaks.  7.  Asthma.     HISTORY OF PRESENT ILLNESS:  This is a 37-year-old  1, para 0 with an   estimated date of confinement of 2022 established by last menstrual   period consistent with a 9-week ultrasound, who presents to labor and delivery   this morning for induction of labor.  She has a history of a DVT and has a   [place] filter in place and has been on Lovenox 40 mg subcutaneous daily   and the recommendation is made for induction of labor after stopping her   Lovenox for 24 hours.  The patient understands there is an increased risk of   an operative delivery including  section with induction of labor.  All   of her questions were answered and she agrees.     Prenatal care has been with Dr. Mauricio.  Her estimated date of confinement of   2022 was established by last menstrual period consistent with a 9-week   ultrasound.     PRENATAL LABS:  Her blood type is A positive, antibody screen negative, RPR   nonreactive, rubella immune, hepatitis B surface antigen negative, hepatitis C   negative, HIV negative.  Her 1-hour Glucola was abnormal.  Her 3-hour oral   glucose tolerance test 0/4 were abnormal.  Group B strep status is negative.    Her noninvasive  screening was low risk.  Her AFP only was low risk.     Complications with the pregnancy include advanced maternal age.  She has been   followed by Dr. Mckeon.  She had normal anatomy scan except for the fetal heart   was not well visualized secondary to presentation and maternal obesity and the   baby will need a  echo.  She has an anterior placenta with no previa.    She had a total weight gain for the pregnancy of 55 pounds.  She has chronic   hypertension and baseline CMP was  drawn.  She had elevated AST and ALT at that   time, but redraw showed normal values.  She has a history of a DVT, which   developed after ankle surgery.  She was found to have MTHFR mutation.  She was   started on Lovenox during this pregnancy.  She also has a [place] filter   in place.  She has a history of asthma, but does not require an inhaler.     ALLERGIES:  She has no known drug allergies.     MEDICATIONS:  Include Valtrex 500 mg p.o. every day, labetalol 100 mg p.o.   b.i.d. and Lovenox 40 mg subcutaneous q. day.     PAST MEDICAL HISTORY:  Significant for DVTs and pulmonary embolus, which   developed after ankle surgery.  She has MTHFR mutation.  She is obese.  She   has chronic hypertension and asthma.     PAST SURGICAL HISTORY:  Significant for ankle surgery, sinus surgery, vena   cava green filter placement.     SOCIAL HISTORY:  She denies tobacco, alcohol or IV drug use.     FAMILY HISTORY:  She has a father with colon cancer at age 40.     GYNECOLOGIC HISTORY:  She has HSV, but has not had any outbreaks during this   pregnancy and has been on daily acyclovir. She has no abnormal Paps.     OBSTETRICAL HISTORY:   1 is her current pregnancy.     PHYSICAL EXAMINATION:  VITAL SIGNS:  Temperature is 97.6 pulse is 91, blood pressure is 156/92.  GENERAL:  She is pleasant, in no apparent distress.  ABDOMEN:  Gravid and nontender.  EXTREMITIES:  Show +1 pedal edema.  She has no cords or Homans sign.  PELVIC:  Fetal heart tones are in the 140s and a category 1 tracing.    Tocometry shows no contractions.  Cervical exam per the nurse on admission,   she is fingertip dilated, 30% effaced, -2 station, vertex presentation.    Estimated fetal weight is 3800 grams.     IMPRESSION:  This is a 37-year-old  1, para 0 at 39 and 2/7 weeks who   presents to labor and delivery for induction of labor secondary to Lovenox use   for history of a DVT developing after ankle surgery and a pulmonary embolus,   who  has a [place] filter in place.     PLAN:  1.  The patient will be admitted to labor and delivery.  2.  Saline lock will be placed.  3.  Misoprostol 25 mcg per vagina q 4 hours will be started.  4.  Her blood pressures are elevated on admission; however, she did not take   her labetalol this morning.  We will give her 200 mg of p.o. labetalol x1 now   and also draw CMP, Uric acid  and protein creatinine ratio.  5.  She has a history of HSV, but no recent outbreaks and she is on daily   acyclovir and she will be given another dose of that this morning.  6.  Her Lovenox was last dosed at 0815 hours yesterday morning and will be   restarted after a vaginal delivery in 6 hours or 12 hours after    section.  SCDs will be placed.  7.  The baby will need a  echo after delivery.        ______________________________  MD BABS HOPKINS/LALO/CAR    DD:  2022 09:32  DT:  2022 10:09    Job#:  [id number]    CC:JOELLE HIGUERA MD

## 2022-11-27 LAB
ALBUMIN SERPL BCP-MCNC: 3.1 G/DL (ref 3.2–4.9)
ALBUMIN/GLOB SERPL: 1.1 G/DL
ALP SERPL-CCNC: 64 U/L (ref 30–99)
ALT SERPL-CCNC: 16 U/L (ref 2–50)
ANION GAP SERPL CALC-SCNC: 11 MMOL/L (ref 7–16)
AST SERPL-CCNC: 25 U/L (ref 12–45)
BILIRUB SERPL-MCNC: 0.2 MG/DL (ref 0.1–1.5)
BUN SERPL-MCNC: 11 MG/DL (ref 8–22)
CALCIUM SERPL-MCNC: 8.4 MG/DL (ref 8.5–10.5)
CHLORIDE SERPL-SCNC: 101 MMOL/L (ref 96–112)
CO2 SERPL-SCNC: 20 MMOL/L (ref 20–33)
CREAT SERPL-MCNC: 0.67 MG/DL (ref 0.5–1.4)
ERYTHROCYTE [DISTWIDTH] IN BLOOD BY AUTOMATED COUNT: 45.8 FL (ref 35.9–50)
GFR SERPLBLD CREATININE-BSD FMLA CKD-EPI: 114 ML/MIN/1.73 M 2
GLOBULIN SER CALC-MCNC: 2.9 G/DL (ref 1.9–3.5)
GLUCOSE SERPL-MCNC: 111 MG/DL (ref 65–99)
HCT VFR BLD AUTO: 36.1 % (ref 37–47)
HGB BLD-MCNC: 12.2 G/DL (ref 12–16)
MCH RBC QN AUTO: 30.7 PG (ref 27–33)
MCHC RBC AUTO-ENTMCNC: 33.8 G/DL (ref 33.6–35)
MCV RBC AUTO: 90.7 FL (ref 81.4–97.8)
PLATELET # BLD AUTO: 145 K/UL (ref 164–446)
PMV BLD AUTO: 12.3 FL (ref 9–12.9)
POTASSIUM SERPL-SCNC: 4.1 MMOL/L (ref 3.6–5.5)
PROT SERPL-MCNC: 6 G/DL (ref 6–8.2)
RBC # BLD AUTO: 3.98 M/UL (ref 4.2–5.4)
SODIUM SERPL-SCNC: 132 MMOL/L (ref 135–145)
WBC # BLD AUTO: 8.3 K/UL (ref 4.8–10.8)

## 2022-11-27 PROCEDURE — 80053 COMPREHEN METABOLIC PANEL: CPT

## 2022-11-27 PROCEDURE — A9270 NON-COVERED ITEM OR SERVICE: HCPCS | Performed by: OBSTETRICS & GYNECOLOGY

## 2022-11-27 PROCEDURE — 700111 HCHG RX REV CODE 636 W/ 250 OVERRIDE (IP): Performed by: ANESTHESIOLOGY

## 2022-11-27 PROCEDURE — A9270 NON-COVERED ITEM OR SERVICE: HCPCS | Performed by: ANESTHESIOLOGY

## 2022-11-27 PROCEDURE — 770002 HCHG ROOM/CARE - OB PRIVATE (112)

## 2022-11-27 PROCEDURE — 700111 HCHG RX REV CODE 636 W/ 250 OVERRIDE (IP): Performed by: OBSTETRICS & GYNECOLOGY

## 2022-11-27 PROCEDURE — 700102 HCHG RX REV CODE 250 W/ 637 OVERRIDE(OP): Performed by: ANESTHESIOLOGY

## 2022-11-27 PROCEDURE — 36415 COLL VENOUS BLD VENIPUNCTURE: CPT

## 2022-11-27 PROCEDURE — 85027 COMPLETE CBC AUTOMATED: CPT

## 2022-11-27 PROCEDURE — 700102 HCHG RX REV CODE 250 W/ 637 OVERRIDE(OP): Performed by: OBSTETRICS & GYNECOLOGY

## 2022-11-27 RX ADMIN — NIFEDIPINE 10 MG: 10 CAPSULE ORAL at 11:39

## 2022-11-27 RX ADMIN — NIFEDIPINE 10 MG: 10 CAPSULE ORAL at 03:25

## 2022-11-27 RX ADMIN — LABETALOL HYDROCHLORIDE 200 MG: 100 TABLET, FILM COATED ORAL at 06:13

## 2022-11-27 RX ADMIN — KETOROLAC TROMETHAMINE 30 MG: 30 INJECTION, SOLUTION INTRAMUSCULAR at 05:27

## 2022-11-27 RX ADMIN — KETOROLAC TROMETHAMINE 30 MG: 30 INJECTION, SOLUTION INTRAMUSCULAR at 11:37

## 2022-11-27 RX ADMIN — NIFEDIPINE 10 MG: 10 CAPSULE ORAL at 18:19

## 2022-11-27 RX ADMIN — ACETAMINOPHEN 1000 MG: 500 TABLET ORAL at 11:36

## 2022-11-27 RX ADMIN — ACYCLOVIR 400 MG: 200 CAPSULE ORAL at 06:13

## 2022-11-27 RX ADMIN — ACETAMINOPHEN 1000 MG: 500 TABLET ORAL at 23:14

## 2022-11-27 RX ADMIN — ACETAMINOPHEN 1000 MG: 500 TABLET ORAL at 17:10

## 2022-11-27 RX ADMIN — LABETALOL HYDROCHLORIDE 200 MG: 100 TABLET, FILM COATED ORAL at 17:09

## 2022-11-27 RX ADMIN — DOCUSATE SODIUM 100 MG: 100 CAPSULE, LIQUID FILLED ORAL at 18:21

## 2022-11-27 RX ADMIN — ENOXAPARIN SODIUM 40 MG: 40 INJECTION SUBCUTANEOUS at 11:36

## 2022-11-27 RX ADMIN — ACETAMINOPHEN 1000 MG: 500 TABLET ORAL at 05:27

## 2022-11-27 RX ADMIN — IBUPROFEN 800 MG: 800 TABLET, FILM COATED ORAL at 23:14

## 2022-11-27 RX ADMIN — KETOROLAC TROMETHAMINE 30 MG: 30 INJECTION, SOLUTION INTRAMUSCULAR at 17:09

## 2022-11-27 RX ADMIN — ONDANSETRON HYDROCHLORIDE 4 MG: 2 SOLUTION INTRAMUSCULAR; INTRAVENOUS at 03:34

## 2022-11-27 RX ADMIN — PRENATAL WITH FERROUS FUM AND FOLIC ACID 1 TABLET: 3080; 920; 120; 400; 22; 1.84; 3; 20; 10; 1; 12; 200; 27; 25; 2 TABLET ORAL at 08:50

## 2022-11-27 ASSESSMENT — PATIENT HEALTH QUESTIONNAIRE - PHQ9
1. LITTLE INTEREST OR PLEASURE IN DOING THINGS: NOT AT ALL
2. FEELING DOWN, DEPRESSED, IRRITABLE, OR HOPELESS: NOT AT ALL
1. LITTLE INTEREST OR PLEASURE IN DOING THINGS: NOT AT ALL
SUM OF ALL RESPONSES TO PHQ9 QUESTIONS 1 AND 2: 0
2. FEELING DOWN, DEPRESSED, IRRITABLE, OR HOPELESS: NOT AT ALL
SUM OF ALL RESPONSES TO PHQ9 QUESTIONS 1 AND 2: 0

## 2022-11-27 ASSESSMENT — PAIN DESCRIPTION - PAIN TYPE
TYPE: ACUTE PAIN

## 2022-11-27 ASSESSMENT — EDINBURGH POSTNATAL DEPRESSION SCALE (EPDS): I HAVE BEEN ABLE TO LAUGH AND SEE THE FUNNY SIDE OF THINGS: NOT QUITE SO MUCH NOW

## 2022-11-27 NOTE — PROGRESS NOTES
BP continue to be in severe range despite several antihypertensive medications. Pt is remote from delivery.  Now has HA and non-dependent edema. Reviewed P/C/R/B of continued attempt at vaginal delivery versus proceeding with Primary C/S.  Pt agrees with C/S.  P/C/R/B reviewed.  Consent signed.  Will re-start Lovenox 12 hours post op.  Monitor BP and possibly start mag Sulfate if needed for sz prophylaxis.

## 2022-11-27 NOTE — OP REPORT
DATE OF SERVICE:  2022     PREOPERATIVE DIAGNOSES:  1.  Intrauterine pregnancy at 39 and 2/7th weeks.  2.  History of deep venous thrombosis and pulmonary embolus, who has been on   Lovenox.  3.  Advanced maternal age.  4.  Obesity.  5.  Chronic hypertension with superimposed severe range blood pressures.  6.  History of HSV with no outbreaks.  7.  Asthma.  8.  Remote from delivery.     POSTOPERATIVE DIAGNOSES:  1.  Intrauterine pregnancy at 39 and 2/7th weeks.  2.  History of deep venous thrombosis and pulmonary embolus, who has been on   Lovenox.  3.  Advanced maternal age.  4.  Obesity.  5.  Chronic hypertension with superimposed severe range blood pressures.  6.  History of HSV with no outbreaks.  7.  Asthma.  8.  Remote from delivery.  9.  Nuchal cord x1.     PROCEDURE:  Primary lower uterine segment transverse  section.     SURGEON:  Stephanie Mauricio MD     ASSISTANT:  Duncan José MD     ANESTHESIA:  Spinal.     ANESTHESIOLOGIST:  Michael Blackburn MD     ESTIMATED BLOOD LOSS:  500 mL.     FLUIDS:  1000 mL crystalloid.     URINE OUTPUT:  400 mL clear urine.     FINDINGS:  Liveborn female infant, weight 6 pounds 6 ounces, Apgars 8 and 9,   clear amniotic fluid.  Normal-appearing placenta with 3-vessel cord.    Normal-appearing uterus, tubes, and ovaries.  Nuchal cord x1.     SPECIMENS:  None.     DRAINS:  Neil to gravity.     COMPLICATIONS:  None apparent.     DISPOSITION:  The patient to recovery room in stable condition.     TECHNIQUE:  After adequate informed consent was obtained, the patient was   taken to the operating room.  She was placed under spinal anesthetic and laid   in the supine position with a left lateral tilt.  Fetal heart tones were   obtained and found to be in the 140s.  She had a Neil catheter placed to   gravity and sequential stockings had been on her lower extremities during her   labor induction.  She received 3 grams of Ancef preoperatively.  She was then    prepped and draped in the usual sterile fashion.  A timeout was taken to   confirm the proper patient and procedure.  After ensuring adequate anesthesia,   a Pfannenstiel skin incision was made and carried down sharply to the fascia.    The fascia was nicked in midline and dissected bilaterally using sharp   dissection.  The fascia was then  from the rectus muscle both   superiorly and inferiorly using sharp dissection.  The peritoneum was   identified and entered bluntly after ensuring no evidence of bowel or bladder   underneath.  The peritoneum was then dissected superiorly and inferiorly using   sharp dissection.  An Khang O retractor was placed with care taken to ensure   no bowel impingement occurred.  Using Metzenbaum scissors, a bladder flap was   created.  Using a scalpel, a transverse incision was made across the lower   uterine segment to the level of the amniotic sac.  The uterine incision was   identified and extended bilaterally using blunt dissection.  The amniotic sac   was encountered and ruptured and clear fluid was noted.  The surgeon's hand   was placed in the uterine cavity and the fetal head was elevated and delivered   without complication.  A loose nuchal cord x1 was easily reduced.  Mouth and   nose were bulb suctioned.  The rest of baby delivered easily.  This is a   viable female infant, weight 6 pounds 6 ounces, Apgars 8 and 9.  There was   delayed cord clamping for 30 seconds.  The cord was then clamped twice and cut   and the infant handed off to the waiting respiratory therapist and nursing   staff, crying vigorously.  The placenta was removed with manual extraction.    The patient then received 20 units of IV Pitocin and 1000 mL of lactated   Ringer's IV.  The uterine cavity was curetted using a dry lap sponge and there   was no evidence of any retained products of conception.  The uterine incision   was identified and closed using a single suture of 0 Vicryl in a running    locked fashion.  Good hemostasis was noted.  The wound was irrigated copiously   and no active bleeding was noted.  The Khang O retractor was removed.    Seprafilm was placed on the anterior surface of the uterus and the lower   uterine segment.  The peritoneum was identified and closed using a single   suture of 2-0 Vicryl in a running fashion.  The rectus muscles were   reapproximated in midline using a figure-of-eight suture.  The wound was   irrigated copiously.  No bleeding was noted beneath the fascia.  The fascia   was reapproximated using two separate sutures of 0 Vicryl in a running fashion   overlapping in the midline.  The wound was irrigated copiously.  No bleeding   was noted.  Pham's fascia was reapproximated using 3-0 Vicryl in a running   fashion.  Subcutaneous tissue was reapproximated using 3-0 Vicryl in a running   fashion.  The skin was reapproximated using 4-0 Vicryl in a running fashion.    Steri-Strips were applied and Mepilex dressing was applied.  Sponge and   instrument counts were correct x3.  The patient tolerated the procedure well   and there were no apparent complications.  She was taken to the recovery room   in stable condition and she will be restarted on Lovenox 12 hours postop and   her blood pressures will be monitored.        ______________________________  MD BABS HOPKINS/DIONICIO    DD:  11/26/2022 22:08  DT:  11/26/2022 22:51    Job#:  [id number]

## 2022-11-27 NOTE — PROGRESS NOTES
Patient transferred from L&D received report from Hallie LYLE Assessment done. Fundus firm with light lochia. Orientated patient to room. Education provided on how to use bulb suction, call light, I/O sheet, what to do if there is an emergency, how to call nurse. Patient declines pain at this time.

## 2022-11-27 NOTE — LACTATION NOTE
This note was copied from a baby's chart.  Met with parents of baby Rowena this morning, Vinita and Shad, to discuss lactation plan. They were performing skin to skin care with baby during my visit.    We explored normal expectations and common  activities for the fir 12-24 hour of life period. Encouraged mother to keep baby skin to skin and observe for feeding cues, while taking into consideration other normal bodily functions that may prohibit baby wanting to latch or feed at times. I reviewed the parent education pamphlet with breast feeding and safe sleep information with parents.I encouraged them to call for assistance with latch as needed.

## 2022-11-27 NOTE — CARE PLAN
Problem: Psychosocial - Postpartum  Goal: Patient will verbalize and demonstrate effective bonding and parenting behavior  Outcome: Progressing     Problem: Altered Physiologic Condition  Goal: Patient physiologically stable as evidenced by normal lochia, palpable uterine involution and vitals within normal limits  Outcome: Progressing   The patient is Stable - Low risk of patient condition declining or worsening    Shift Goals  Clinical Goals: VSS, pain management  Patient Goals: pain management    Progress made toward(s) clinical / shift goals:  Patient fundus firm with light lochia. Patient bonding with infant and doing skin to skin.

## 2022-11-27 NOTE — ANESTHESIA POSTPROCEDURE EVALUATION
Patient: Vinita Fields    Procedure Summary     Date: 22 Room / Location: [facility] OR 01 / SURGERY LABOR AND DELIVERY    Anesthesia Start:  Anesthesia Stop:     Procedure:  SECTION, PRIMARY (Abdomen) Diagnosis: (elevated B/P)    Surgeons: Stephanie Mauricio M.D. Responsible Provider: Michael Blackburn M.D.    Anesthesia Type: spinal ASA Status: 2          Final Anesthesia Type: spinal  Last vitals  BP   Blood Pressure: (!) 187/95    Temp   36.5 °C (97.7 °F)    Pulse   78   Resp   16    SpO2          Anesthesia Post Evaluation    Patient location during evaluation: floor  Patient participation: complete - patient participated  Level of consciousness: awake and alert  Pain score: 0    Airway patency: patent  Anesthetic complications: no  Cardiovascular status: hemodynamically stable  Respiratory status: acceptable  Hydration status: euvolemic    PONV: none          There were no known notable events for this encounter.

## 2022-11-27 NOTE — PROGRESS NOTES
1710: Report received from MANISHA Partida. Assumed care of pt. Pt to take a lap around the unit with FOB by side.    1742: /97    1803: /100. Dr Mauricio notified, orders received to start hypertensive protocol with labetalol first.    1818: 20 mg of labetalol given, see MAR.    1831: /92.    1838: 40 mg of labetalol given. Pt also states that's since about 1600 she has noticed increased edema and her HA has been getting worse.    1900: Report given to MANISHA Sterling.

## 2022-11-27 NOTE — PROGRESS NOTES
POD #1    S:  Pt alejandra clears.  No flatus.  Neil in place.  Minimal lochia. Working on breast feeding. Pain controlled    O:  - 159  DBP 70 -90  currently 135/80 P 83  T 97.6  ABD: Soft, ND, minimally tender, BS+ hypoactive, ff below umb, dressing dry  EXT: +2 pedal edema, no cords or Homans.  SCDs in place  U/O 250 mL in 4 hours.   Labs - unable to draw    A/P:  POD #1 S/P Primary C/S for severe range BP - normal labs   BP:  Has not required any BP medications from hypertensive protocol.  Continue CHTN Labetalol dose of 200 mg PO BID. Decrease to 100 mg PO BID if BP too low.  Lab unable to draw repeat CBC and CMP this morning.  Will attempt agin.  Currently pt has no sx of pre-ecalmpsia.  HA improved from yesterday.    Hx of DVT/PE:  Re start Lovenox this morning. SCDs in place while in bed.  Encourage ambulation

## 2022-11-27 NOTE — ANESTHESIA PREPROCEDURE EVALUATION
Case: [id number] Date/Time: 22    Procedure:  SECTION, PRIMARY (Abdomen)    Anesthesia type: Spinal    Pre-op diagnosis: elevated B/P    Location: [facility] OR 02 / SURGERY LABOR AND DELIVERY    Surgeons: Stephanie Mauricio M.D.          Relevant Problems   No relevant active problems       Physical Exam    Airway   Mallampati: II  TM distance: >3 FB  Neck ROM: full       Cardiovascular - normal exam  Rhythm: regular  Rate: normal  (-) murmur     Dental - normal exam           Pulmonary - normal exam  Breath sounds clear to auscultation     Abdominal    Neurological - normal exam                 Anesthesia Plan    ASA 2       Plan - spinal   Neuraxial block will be primary anesthetic                Postoperative Plan: Postoperative administration of opioids is intended.    Pertinent diagnostic labs and testing reviewed    Informed Consent:    Anesthetic plan and risks discussed with patient.

## 2022-11-27 NOTE — LACTATION NOTE
Vinita reports this afternoon that baby has latched well a couple of times. We discussed some options for positioning that may be more comfortable for them this evening.

## 2022-11-27 NOTE — DISCHARGE PLANNING
"Discharge Planning Assessment Post Partum    Reason for Referral: Hx of anxiety  Address: [address] [place] [address]  Type of Living Situation:Lives with spouse  Mom Diagnosis:   1.  Intrauterine pregnancy at 39 and 2/7th weeks.  2.  History of deep venous thrombosis and pulmonary embolus, who has been on   Lovenox.  3.  Advanced maternal age.  4.  Obesity.  5.  Chronic hypertension with superimposed severe range blood pressures.  6.  History of HSV with no outbreaks.  7.  Asthma.  8.  Remote from delivery.  Baby Diagnosis: Birth, Apgars 8 and 9  Primary Language: English    Name of Baby: Rowena Fields  Father of the Baby: Shad Fields  Involved in baby’s care? Yes, at bedside.   Contact Information: [phone]    Prenatal Care: Prenatal care has been with Dr. Mauricio.  Her estimated date of confinement of   12/01/2022 was established by last menstrual period consistent with a 9-week   ultrasound.  Mom's PCP: Does not have one.. Educated on how to obtain one   PCP for new baby:Maulik at [facility]    Support System: MOB reports she has a great support system with friends and family.   Coping/Bonding between mother & baby: Yes, per MOB  Source of Feeding: Breastfeeding.   Supplies for Infant: MOB reports she has all supplies. Baby has a car seat and a bassinet for safe sleeping.     Mom's Insurance: [employer]  Baby Covered on Insurance:Will add baby  Mother Employed/School: Yes, tele works at a [employer].  Other children in the home/names & ages: None.     Financial Hardship/Income: No, per MOB   Mom's Mental status: Alert and oriented. Mood\" good\" Affect: pleasant and engaging. MOB denied SI/HI.   Services used prior to admit: None.     CPS History: No, per MOB.   Psychiatric History: Yes, MOB reports anxiety. MOB reports she last saw a provider in 2020 and has taken medications in the past. MOB and this writer reviewed the sign and symptoms of post partum depression and where to seek " help if needed.   Domestic Violence History: No, per MOB.  Drug/ETOH History: No, per MOB    Resources Provided: List of post partum supports and resources for mental health care.   Referrals Made: None.      Clearance for Discharge: MOB and baby are cleared by social work.

## 2022-11-27 NOTE — PROGRESS NOTES
Patient transferred to  room 303 via stretcher. Moved from [facility] via roller pad. Report given to Xiomara DYER. Care relinquished at this time

## 2022-11-27 NOTE — ANESTHESIA PROCEDURE NOTES
Spinal Block    Date/Time: 11/26/2022 8:19 PM  Performed by: Michael Blackburn M.D.  Authorized by: Michael Blackburn M.D.     Start Time:  11/26/2022 8:19 PM  End Time:  11/26/2022 8:29 PM  Reason for Block: primary anesthetic    patient identified, IV checked, site marked, risks and benefits discussed, surgical consent, monitors and equipment checked, pre-op evaluation and timeout performed    Patient Position:  Sitting  Prep: ChloraPrep, patient draped and sterile technique    Monitoring:  Blood pressure, continuous pulse oximetry and heart rate  Approach:  Midline  Location:  L3-4  Injection Technique:  Single-shot  Skin infiltration:  Lidocaine  Strength:  1%  Dose:  3ml  Needle Type:  Pencan  Needle Gauge:  25 G  CSF flowing pre/post injection:  Yes  Sensory Level:  T6

## 2022-11-28 ENCOUNTER — PHARMACY VISIT (OUTPATIENT)
Dept: PHARMACY | Facility: MEDICAL CENTER | Age: 37
End: 2022-11-28
Payer: MEDICARE

## 2022-11-28 VITALS
TEMPERATURE: 98 F | RESPIRATION RATE: 18 BRPM | BODY MASS INDEX: 38.19 KG/M2 | SYSTOLIC BLOOD PRESSURE: 149 MMHG | WEIGHT: 252 LBS | DIASTOLIC BLOOD PRESSURE: 89 MMHG | HEIGHT: 68 IN | OXYGEN SATURATION: 97 % | HEART RATE: 82 BPM

## 2022-11-28 PROCEDURE — 700102 HCHG RX REV CODE 250 W/ 637 OVERRIDE(OP): Performed by: OBSTETRICS & GYNECOLOGY

## 2022-11-28 PROCEDURE — RXMED WILLOW AMBULATORY MEDICATION CHARGE: Performed by: OBSTETRICS & GYNECOLOGY

## 2022-11-28 PROCEDURE — A9270 NON-COVERED ITEM OR SERVICE: HCPCS | Performed by: OBSTETRICS & GYNECOLOGY

## 2022-11-28 RX ORDER — OXYCODONE HYDROCHLORIDE 5 MG/1
5 TABLET ORAL EVERY 4 HOURS PRN
Qty: 30 TABLET | Refills: 0 | Status: SHIPPED | OUTPATIENT
Start: 2022-11-28 | End: 2022-12-05

## 2022-11-28 RX ADMIN — NIFEDIPINE 10 MG: 10 CAPSULE ORAL at 12:11

## 2022-11-28 RX ADMIN — ACETAMINOPHEN 1000 MG: 500 TABLET ORAL at 05:22

## 2022-11-28 RX ADMIN — ACYCLOVIR 400 MG: 200 CAPSULE ORAL at 05:22

## 2022-11-28 RX ADMIN — IBUPROFEN 800 MG: 800 TABLET, FILM COATED ORAL at 07:35

## 2022-11-28 RX ADMIN — DOCUSATE SODIUM 100 MG: 100 CAPSULE, LIQUID FILLED ORAL at 07:35

## 2022-11-28 RX ADMIN — OXYCODONE 5 MG: 5 TABLET ORAL at 07:34

## 2022-11-28 RX ADMIN — NIFEDIPINE 10 MG: 10 CAPSULE ORAL at 02:46

## 2022-11-28 RX ADMIN — ACETAMINOPHEN 1000 MG: 500 TABLET ORAL at 12:11

## 2022-11-28 RX ADMIN — LABETALOL HYDROCHLORIDE 200 MG: 100 TABLET, FILM COATED ORAL at 05:21

## 2022-11-28 ASSESSMENT — EDINBURGH POSTNATAL DEPRESSION SCALE (EPDS)
I HAVE FELT SAD OR MISERABLE: NOT VERY OFTEN
I HAVE BLAMED MYSELF UNNECESSARILY WHEN THINGS WENT WRONG: YES, SOME OF THE TIME
I HAVE BEEN ANXIOUS OR WORRIED FOR NO GOOD REASON: YES, SOMETIMES
I HAVE LOOKED FORWARD WITH ENJOYMENT TO THINGS: AS MUCH AS I EVER DID
I HAVE BEEN SO UNHAPPY THAT I HAVE HAD DIFFICULTY SLEEPING: NOT AT ALL
I HAVE BEEN ABLE TO LAUGH AND SEE THE FUNNY SIDE OF THINGS: AS MUCH AS I ALWAYS COULD
THINGS HAVE BEEN GETTING ON TOP OF ME: NO, MOST OF THE TIME I HAVE COPED QUITE WELL
THE THOUGHT OF HARMING MYSELF HAS OCCURRED TO ME: NEVER
I HAVE BEEN SO UNHAPPY THAT I HAVE BEEN CRYING: NO, NEVER
I HAVE FELT SCARED OR PANICKY FOR NO GOOD REASON: NO, NOT MUCH

## 2022-11-28 NOTE — LACTATION NOTE
This note was copied from a baby's chart.  We worked with baby this morning to latch but were unable to achieve a nutritive feeding. Baby rooted a lot but was tongue sucking. Suck training taught to parents. Follow-up suggested at [facility], referral was submitted.

## 2022-11-28 NOTE — CARE PLAN
The patient is Stable - Low risk of patient condition declining or worsening    Shift Goals  Clinical Goals: VSS, pain management  Patient Goals: pain management    Progress made toward(s) clinical / shift goals:  lochia scant, pain well controlled per pt, appears to be bonding well with infant    Patient is not progressing towards the following goals:      Problem: Risk for Excess Fluid Volume  Goal: Patient will demonstrate pulse, blood pressure and neurologic signs within expected ranges and without any respiratory complications  Outcome: Progressing     Problem: Pain - Standard  Goal: Alleviation of pain or a reduction in pain to the patient’s comfort goal  Outcome: Progressing     Problem: Knowledge Deficit - Standard  Goal: Patient and family/care givers will demonstrate understanding of plan of care, disease process/condition, diagnostic tests and medications  Outcome: Progressing

## 2022-11-28 NOTE — CARE PLAN
Problem: Knowledge Deficit - Postpartum  Goal: Patient will verbalize and demonstrate understanding of self and infant care  Outcome: Progressing     Problem: Early Mobilization - Post Surgery  Goal: Early mobilization post surgery  Outcome: Progressing   The patient is Stable - Low risk of patient condition declining or worsening    Shift Goals  Clinical Goals: VSS, pain management  Patient Goals: pain management    Progress made toward(s) clinical / shift goals:  Patient fundus firm with light lochia. Patient ambulating and performing self susan care.

## 2022-11-28 NOTE — DISCHARGE INSTRUCTIONS
PATIENT DISCHARGE EDUCATION INSTRUCTION SHEET    REASONS TO CALL YOUR OBSTETRICIAN  Persistent fever, shaking, chills (Temperature higher than 100.4) may indicate you have an infection  Heavy bleeding: soaking more than 1 pad per hour; Passing clots an egg-sized clot or bigger may mean you have an postpartum hemorrhage  Foul odor from vagina or bad smelling or discolored discharge or blood  Breast infection (Mastitis symptoms); breast pain, chills, fever, redness or red streaks, may feel flu like symptoms  Urinary pain, burning or frequency  Incision that is not healing, increased redness, swelling, tenderness or pain, or any pus from episiotomy or  site may mean you have an infection  Redness, swelling, warmth, or painful to touch in the calf area of your leg may mean you have a blood clot  Severe or intensified depression, thoughts or feelings of wanting to hurt yourself or someone else   Pain in chest, obstructed breathing or shortness of breath (trouble catching your breath) may mean you are having a postpartum complication. Call your provider immediately   Headache that does not get better, even after taking medicine, a bad headache with vision changes or pain in the upper right area of your belly may mean you have high blood pressure or post birth preeclampsia. Call your provider immediately    HAND WASHING  All family and friends should wash their hands:  Before and after holding the baby  Before feeding the baby  After using the restroom or changing the baby's diaper    WOUND CARE  Ask your physician for additional care instructions. In general:   Incision:  May shower and pat incision dry   Keep the incision clean and dry  There should not be any opening or pus from the incision  Continue to walk at home 3 times a day   Do NOT lift anything heavier than your baby (over 10 pounds)  Encourage family to help participate in care of the  to allow rest and mom time to heal  Continue to  "use susan-bottle until bleeding stops as needed    VAGINAL CARE AND BLEEDING  Nothing inside vagina for 6 weeks:   No sexual intercourse, tampons or douching  Bleeding may continue for 2-4 weeks. Amount and color may vary  Soaking 1 pad or more in an hour for several hours is considered heavy bleeding  Passing large egg sized blood clots can be concerning  If you feel like you have heavy bleeding or are having increasing amount of blood clots call your Obstetrician immediately  If you begin feeling faint upon standing, feeling sick to your stomach, have clammy skin, a really fast heartbeat, have chills, start feeling confused, dizzy, sleepy or weak, or feeling like you're going to faint call your Obstetrician immediately    HYPERTENSION   Preeclampsia or gestational hypertension are types of high blood pressure that only pregnant women can get. It is important for you to be aware of symptoms to seek early intervention and treatment. If you have any of these symptoms immediately call your Obstetrician    Vision changes or blurred vision   Severe headache or pain that is unrelieved with medication and will not go away  Persistent pain in upper abdomen or shoulder   Increased swelling of face, feet, or hands  Difficulty breathing or shortness of breath at rest  Urinating less than usual    URINATION AND BOWEL MOVEMENTS  Eating more fiber (bran cereal, fruits, and vegetables) and drinking plenty of fluids will help to avoid constipation  Urinary frequency and urgency after childbirth is normal  If you experience any urinary pain, burning or frequency call your provider    BIRTH CONTROL  It is possible to become pregnant at any time after delivery and while breastfeeding  Plan to discuss a method of birth control with your physician at your post delivery follow up visit    POSTPARTUM BLUES  During the first few days after birth, you may experience a sense of the \"blues\" which may include impatience, irritability or even " "crying. These feelings come and go quickly. However, as many as 1 in 10 women experience emotional symptoms known as postpartum depression.     POSTPARTUM DEPRESSION    May start as early as the second or third day after delivery or take several weeks or months to develop. Symptoms of \"blues\" are present, but are more intense: Crying spells; loss of appetite; feelings of hopelessness or loss of control; fear of touching the baby; over concern or no concern at all about the baby; little or no concern about your own appearance/caring for yourself; and/or inability to sleep or excessive sleeping. Contact your Obstetrician if you are experiencing any of these symptoms     PREVENTING SHAKEN BABY  If you are angry or stressed, PUT THE BABY IN THE CRIB, step away, take some deep breaths, and wait until you are calm to care for the baby. DO NOT SHAKE THE BABY. You are not alone, call a supporter for help.  [facility] 24/7 crisis call line (030-597-6132) or (1-320.419.7491)  You can also text them, text \"ANSWER\" (280746)  "

## 2022-11-28 NOTE — PROGRESS NOTES
POD #2    S:  Pt doing well.  Passing flatus.  Cathleen regular diet.  Voiding well.  Minimal lochia. Breast feeding.  Ready to go home    O: T 97.3 P 84   - 165  DBP 70 - 89  ABD: Soft, ND, minimally tender, BS + normoactive, ff below umb, dressing dry  EXT: +2 pedal edema, no cords or Homans.  SCDs in place  H/H 12/36 PLTS 145KK  CMP WNL   A+    A/P:  POD #2 S/P Primary C/S for severe range BP remote from delivery - doing well   D/C home  F/U Dr. Mauricio 2 weeks  Pelvic rest for 6 weeks  No lifting > 10 pounds for 6 weeks  Rx for Roxicodone. OTC Motrin and PNV  Continue Lovenox 40 mg SQ QD for 6 weeks  CHTN: Continue Labetalol

## 2022-11-28 NOTE — PROGRESS NOTES
Report received from Xiomara DYER. Assumed care. Patient is awake an alert and would like to be discharged today. Discussed plan of care. Assessment done. Medicated for pain. She's ambulating well. Will check at frequent intervals.

## 2022-11-28 NOTE — DISCHARGE SUMMARY
DATE OF ADMISSION:  2022   DATE OF DISCHARGE:  2022     ADMITTING DIAGNOSES:   1.  Intrauterine pregnancy at 39 and 2/7th weeks.  2.  History of deep venous thrombosis and pulmonary embolus, currently on   Lovenox.  3.  Advanced maternal age.  4.  Obesity.  5.  Chronic hypertension.  6.  History of HSV with no recent outbreaks.  7.  Asthma.     Please see previously dictated history and physical.     HOSPITAL COURSE:  The patient was admitted to labor and delivery with the   above diagnoses on 2022 for induction of labor.  She had received 2   doses of misoprostol; however, her blood pressures, although she has chronic   hypertension escalated into the severe range.  There was no evidence of   preeclampsia based on her symptoms or laboratory studies; however, because she   was remote from delivery and her blood pressures required severe range   medication, the recommendation was made to undergo a  section.  She   underwent a primary lower uterine segment transverse  section without   complication.  She had an estimated blood loss for the delivery of 500 mL. She   had a viable female infant, weight 6 pounds 6 ounces, Apgars 8 and 9.  The   patient's postpartum and postoperative course have been unremarkable.  She has   not required any severe range hypertension medications and on postoperative   day 2, she was tolerating the regular diet, passing flatus, voiding well, had   minimal lochia, was breastfeeding and was ready to be discharged home.     PHYSICAL EXAMINATION ON DISCHARGE:    VITAL SIGNS:  Temperature was 97.3, pulse 84, systolic blood pressures ranged   postpartum from 124-165, diastolics between 70 and 89.  ABDOMEN:  Soft, nondistended, minimally tender.  Bowel sounds are positive and   normoactive.  Her fundus is firm below the umbilicus.  Her dressing is dry.  EXTREMITIES:  Show +2 pedal edema.  She has no cords or Homans sign.  She has   SCDs in place.     LABORATORY  DATA:  Postoperative H and H were 12 and 36, platelet count was   145,000.  Her CMP was normal.  Blood type is A positive.     DISCHARGE DIAGNOSES:   1.  Intrauterine pregnancy at 39 and 2/7th weeks.  2.  Status post primary lower uterine segment transverse  section.  3.  Chronic hypertension with severe range blood pressures during labor   induction.  4.  History of DVT and pulmonary embolus with Terrance filter in place.  5.  Chronic hypertension.  6.  Asthma.  7.  Obesity.  8.  Advanced maternal age.     DISCHARGE INSTRUCTIONS:   1.  The patient will be discharged home with instructions to follow up with   Dr. Mauricio in 2 weeks.  2.  She is instructed on pelvic rest for 6 weeks, no driving for 2 weeks and   no lifting over 10 pounds for 6 weeks.  3.  She has restarted her Lovenox 40 mg subcutaneous daily.  We will continue   this for 6 weeks postpartum and then changed to aspirin 81 mg.   4.  Chronic hypertension.  She will continue her labetalol at 100 mg p.o.   b.i.d. and monitor her pressures as needed.        ______________________________  MD BABS HOPKINS/WENDI    DD:  2022 06:39  DT:  2022 07:02    Job#:  [id number]

## 2022-11-28 NOTE — PROGRESS NOTES
Discharge instruction discussed. She received her home medication and is at the bedside. Emphasized the importance of  screening follow-up test. Questions and concerns have been answered. Patient and baby are both doing well. They have provided car seat for baby.

## 2022-11-28 NOTE — PROGRESS NOTES
Assessment completed. Patient fundus firm with light lochia. Patient ambulating. Patient denies pain at this time. Plan of care discussed with patient. Educated provided to MOB and FOB to keep a hat on infant as well as keeping infant swaddled in at least two blankets if not doing skin to skin or breast feeding. Education also provided to keep infant covered while doing skin to skin.

## 2022-11-29 NOTE — DISCHARGE PLANNING
Meds-to-Beds: Discharge prescription order listed below delivered to patient's bedside. MANISHA Beck notified. Patient counseled. Patient elected to have co-payment billed to patient account.      Current Outpatient Medications   Medication Sig Dispense Refill    oxyCODONE immediate-release (ROXICODONE) 5 MG Tab Take 1 Tablet by mouth every four hours as needed for Severe Pain for up to 7 days. 30 Tablet 0      Xiomara Molina, PharmD

## 2022-12-06 ENCOUNTER — OFFICE VISIT (OUTPATIENT)
Dept: OBGYN | Facility: CLINIC | Age: 37
End: 2022-12-06
Payer: COMMERCIAL

## 2022-12-06 DIAGNOSIS — O92.29 SORE NIPPLES DUE TO LACTATION: ICD-10-CM

## 2022-12-06 DIAGNOSIS — O92.5 LACTATION SUPPRESSION: ICD-10-CM

## 2022-12-06 DIAGNOSIS — Z91.89 AT RISK FOR POSTPARTUM DEPRESSION: ICD-10-CM

## 2022-12-06 DIAGNOSIS — O92.70 LACTATION PROBLEM: ICD-10-CM

## 2022-12-06 PROCEDURE — 99215 OFFICE O/P EST HI 40 MIN: CPT | Performed by: NURSE PRACTITIONER

## 2022-12-06 NOTE — PROGRESS NOTES
Summary: Latched well in the hospital, home but by third day knew baby needed supplement. Started formula and pumping with breastfeeding every feeding for the last week. This is not sustainable. Family good support system.   Today: Sleepy baby growing well, took quite some time to awaken her then latched well , assisted for depth, nipple creased when finished. Transferred 0ml of milk, mom pumped for less than 2ml total.   Plan: Night time survive with trading off feeds with dad or if baby sleeps 4-5 hours then continue with working together.  Breastfeeding at night is not recommended, offer in the day to establish the pattern. Continue with bottles, vary volumes as she leads, parents reading cues well. Pump settings discussed  Follow up:   Lactation appointment: As needed and 10-14 days, group encouraged  Pediatrician appointment: 14 Day Well Child Check   Referrals:  Ob/gyn for blood pressure report    Subjective:     Vinita Fields is a 37 y.o. female here for lactation care. She is here today with baby and  (Shad).    Concerns:   Maternal: nipple pain  and feeling that there is not enough milk   Infant: sleepy baby, baby not interested, baby preference for one breast, and gassy baby     HPI:   Pertinent  history: c/section    Mother does not have a history of GDM, hypertension prior to pregnancy, insulin resistance, multiple gestation, PCOS, thyroid disease, and auto immune disease . Common condition(s) which may interfere with milk supply.    Mother does have advanced maternal age, GHTN, and irregular periods in adolescence treated with OCP . Common condition(s) that may interfere in milk supply.    Breast changes in pregnancy: Yes  History of breast surgeries: No    FEEDING HISTORY:    Previous Breastfeeding History: First baby.   Hospital Course: Latched well in the hospital  Currently 2022  home but by third day knew baby needed supplement. Started formula and pumping with  breastfeeding every feeding for the last week. This is not sustainable. Family good support system.      Both breasts: Yes    Supplement: Expressed breast milk and Formula  Quantity: 2oz  How given/devices:  Bottle  Bottle/nipple type: Spectra nipple level 1    Nipple Shield Use: None    Breast Pumping:   Frequency 4-6x/day, Quantity obtained syringe volume, Type of pump Spectra , and Flange size/type 24mm  NO pain with pumping    Maternal ROS:  Constitutional: No fever, chills. Feeling well  Breasts: No soreness of breasts and Soreness of nipples  Psychiatric: Managing ok, recent mother loss   Mental Health: No mention of feeling irritable, agitated, angry, overwhelmed, apathetic, appetite changes, exhausted nor having sleep changes outside infant feeds/demands.  Current Outpatient Medications on File Prior to Visit   Medication Sig Dispense Refill    enoxaparin (LOVENOX) 40 MG/0.4ML Solution Prefilled Syringe inj       labetalol (NORMODYNE) 100 MG Tab Take 100 mg by mouth 2 times a day.      valACYclovir (VALTREX) 500 MG Tab        No current facility-administered medications on file prior to visit.      No past medical history on file.        Objective:     Maternal Physical Lactation Exam  General: No acute distress  Breasts: Symmetrical  and Soft  Nipples: abraded and erythematous  Psychiatric: Normal mood and affect. Her behavior is normal. Judgment and thought content normal.  Mental Health: Did NOT exhibit sadness, crying, teary, feeling overwhelmed, agitation or hypervigilance.    Assessment/Plan & Lactation Counseling:     Infant Exam on Infant Chart    Infant Weight History:   11/26/22  6#6.3oz  12/6/2022 6#6.9oz    Infant Intake at Breast: 0ml   Milk Transfer at this feeding:   Ineffective breastfeeding; not able to transfer a full feed from breast r/t lack of milk baby may be able to transfer larger quantities when available  Milk transfer impacted by    Milk volume lacking     Pumped:   Type of Pump:  Hospital grade and Platinum    Quantity Pumped:       Total: 2ml  Initiation of Feeding: Needed to be roused  Position of Feeding:    Right: football  Left: football  Attachment Achieved: with much work to awaken baby  Nipple shield: N/A       Difficult Latch Due To:   Sleepy baby  Suck Pattern at the Breast: Suck burst and normal rest  Suck Pattern on the Bottle:    Suck burst and normal rest  Behavior Following Observed Feeding: content  Nipple Pain from:Contact forces of the tongue causing nipple strain resulting in damage, shallow latch    Latch: Mom latches independently and Assisted latch  Suckling/Feeding: attaches, baby roots, elicits JANETTE, frequent pauses, and rhythmic  Sucking strength: Moderate Strong  Sucking Rhythm Coordinated  Compression: WNL    Once latched, baby fell into a mature and fully integrated SSB pattern for short periods of time, no swallowing heard.    Swallowing No difficulty noted  Milk Supply Available: low    Low Milk Supply:   Likely due to: maternal medical history    Maternal Diagnosis/Problem:  Lactation Problem, At risk for PPD, and Sore nipples due to lactation and lactation suppression    MATERNAL PERSONALIZED BREASTFEEDING PLAN  Discussed concerns and symptoms as listed above in assessment and guidance summarized below. Shared decision making was used between myself and the family for this encounter, home care, and follow up. Topics reviewed included:   4th Trimester: The 12-week period immediately after mom has had the baby. Not everyone has heard of it, but every mother and their  baby will go through it. It is a time of great physical and emotional change as the baby adjusts to being outside the womb, and the family adjusts to new life as parents  During the fourth trimester, one can expect fussiness and crying from the baby and very likely exhaustion for the family.  babies are learning to adjust to life outside the womb where it was warm and squishy!  There  is a lot of misinformation about babies and their needs, and parents are often encouraged to ignore baby's signals. Bad idea. Babies are “half-baked” at birth and have much to learn with the help of physical and emotional support from caregivers. Taking care of a baby's needs is an investment that pays off with a happier, healthier child and adult.  It can take weeks or even months for your body to feel totally normal again. There is a major hormonal upheaval experienced by moms in the first few weeks after birth, because their bodies are shifting from many pregnancy hormones to a more normal hormonal make-up.   Mood and Anxiety Disorders: Having a new baby can be joyful time for some new moms, but a difficult time for others. For all, it is a time of profound physical and emotional change. Balancing baby, family, partners and friends, work, pets, and preexisting or new life stressors as well as sleep deprivation can be extremely challenging. Untreated depression, sleep exhaustion, and anxiety are each a challenge that must be addressed and in addition can contribute to early cessation of breastfeeding. It is important both for the mental health and physical health of new moms and babies to get on the path to feeling better and if therapeutic support is needed, specific resources are available, please ask.   Sleep or Lack of: discussed strategies to manage restorative sleep, although short amounts, significant to the mental health of the mother. The principle follows:  Mom goes to sleep right after 8pm +/- feeding  Partner covers first late evening feeding (10pm/11pm), settles baby,  then goes to bed  Mom covers next feeding 1am /2am, partner sleeps  Next feeding share  Self-Compassion through Relational Support and Interaction.   Be kind to ourself. This is the first step in reducing anxiety and depression. Pay attention to how you are doing.   What do you need? Food, Rest, Sleep, Support, to Laugh/giggle:  "who will you ask today? They want to help you. We want to help you.   How do you stop your self-blame, or your self-criticism?   Get out of the house each day, walk or drive somewhere or ask a friend to drive you,  a \"treat\" at a drive through.  Milk Supply is dependent on glandular tissue development, hormonal influences, how many times the baby removes milk and how well the breasts are emptied in a 24 hour period. This is a biological reality that we can NOT work around. If, for any reason, your baby is not latching, or you are not able to nurse, then it is important for you to remove the milk instead by pumping or hand expression.  There's no magic trick, tea, food, drink, cookie or supplement that will increase your milk supply. One  must  effectively remove milk to continue to make and maximize milk. In the early days and weeks that can be 8+ times in 24 hours. For older babies, on average 6-7 + times in 24 hours.    Hydration: Staying hydrated is important however lack of hydration is usually not a cause of significantly low milk production.  Everyone needs a different amount of water, depending on their activity and diet. A high salt and/or high-protein diet, high physical activity, or very warm weather/sweating will require more fluids. A person eating a diet high in veggies and fruit, with a lack of physical activity will require fewer fluids. There is no magic number for the # of ounces of water each day.The best way to know that you are well hydrated is by looking at your urine.  Urine should look clear to light pale yellow, and you should need to pee at least every 3 to 4 hours unless you have a large bladder capacity.  Low Milk Supply: [place] births 3.6million, 5-15% chronic insufficient production, 180,000- 500,000 individuals - not as rare as we think (Carmen et al, 2021). Your common factors:  Puberty hormonal influence with irregular periods  Milk never came in the first 7-10 days with normal " breast tissue: retained placenta a consideration  Lack of nipple/breast stimulation (Baby at the breast but not suckling, mostly non nutritive sucking)  Hypertensive Disease in PG (HDP) (preeclampsia, Gestational hypertension and eclampsia)is associated delayed lactogenesis ll and reduced  milk production:   Postpartum edema (3rd spacing of fluid) ? lower   intravascular volume ? poor diffusion of fluid into  mammary alveoli ? delayed onset of copious milk  production    postpartum breast edema may also contribute to difficulties  with latch; sore nipples   role of underlying metabolic condition    Herbs and Medications  A galactagogue is an herb or medication taken by a breastfeeding mother to increase her milk supply. We know that for centuries mothers around the world have sought out remedies to increase their milk supply. However, there is limited research on the safety and effectiveness of herbal galactagogues, which makes it hard for us to endorse them. It is not known if any of these herbs are truly effective, and it is difficult to predict how a specific herbal galactagogue will affect an individual, requiring “trial and error” in many situations. When effective, results are generally seen within 24-72 hours of starting an herbal galactagogue. Many of these herbs are used to decrease high blood sugar. If you are diabetic or have problems with low blood sugar, or thyroid disease you may not be a candidate for herbs. Not all women can increase their low milk supply with a galactagogue due to the many underlying causes of low milk production.  When taking a galactagogue, remember that frequent milk removal is still the most effective way to increase supply.   Feeding:   Infant feeding well given current interval growth, guidelines to follow:  Feed your baby every 1.5-3 hours, more often if baby acts hungry.   Awaken baby for feeding if going over 3 hours in the day.    Daily goal: 8-12 feedings per 24 hours.     Given infants weight you may allow baby to go longer at night but that generally means shorter durations in the day.  Supplement:   Supplement with expressed milk  Supplement with formula   Proper powder formula preparation: https://www.cdc.gov/nutrition/downloads/prepare-store-powered-ttdiut-malnbds-441.pdf.   For babies under 2 months: https://www.cdc.gov/cronobacter/infection-and-infants.html  When bottle-feeding, there are three primary things to consider:    Nipple Shape:  Look for a nipple that looks like a “breast at work” not a “breast at rest.”  A “breast at work” has a somewhat cone shape as the nipple and breast tissue is pulled into the baby’s mouth while feeding.  Your baby’s mouth should be able to go around the widest part of the nipple to form a wide-open gape on the bottle like that of a good latch at the breast. In contrast, a breast at rest might look more like, well, a breast: a roundish base with a  nipple.  If the bottle looks like this, your baby’s lips may not be able to get around the widest part of the nipple because it is just too wide resulting in a narrow gape that would hurt your nipple. This nipple shape may also make it difficult for your baby to make a complete seal with their lips which leads to air intake and milk spillage.Wide or narrow neck bottles are your choice.   Flow Rate of the Nipple:  The nipple flow should be slow.  Don’t just read the label, but notice how your baby is feeding and trust what you observe.  A study done a few years ago found that “slow flow” varied widely between brands and even between nipples of the same brand.  Try a few nipples until you find one that results in a rhythmic sucking pattern but not chugging and gulping.  Pacing the feeding:  A slow flow nipple helps, but how you feed the baby is more important.  Good positioning can compensate for a faster flow nipple.  When bottle-feeding, the baby should control how much is consumed at a feeding.   Holding the baby in an upright position with the bottle horizontal ensures that the baby gets milk only when sucking.  Here is a nice video demonstrating this concept of paced bottle feeding,  https://www.youtube.com/watch?v=UeRVZ9yPT3A  Pacifier Use:  The American Academy of Pediatrics' Position Paper reports: Although we recommend a conservative approach regarding pacifier use, we do not endorse a complete ban on the use of pacifiers, nor do we support an approach that induces parental guilt concerning their choices about the use of pacifiers. Pacifier use and breastfeeding in term and  newborns- a systematic review and meta-analysis from the  J of Pediatrics Published online 2022. Has found that when pacifiers are used among individuals who have been counseled on the risks, do not interfere with breastfeeding exclusivity or duration. These are parental choices.  Positioning Techniques for Bare Breast  Pillow used: Twila Steinberg  Suggested positions Football  Fine tune position by making sure your fingers beneath the breast as well as your bra, are out of the way of your baby's chin.  Positioning: See http://globalhealthmedia.org/portfolio-items/positions-for-breastfeeding/?yygsrydekDM=04435  Latch on Techniques for Bare Breast.   Aim is an asymmetric deep latch.  First make yourself comfortable. Sit with knees bent (unless lying down), feet on a stool if needed. You will support your baby, and pillows will be used to support YOU. You want your baby's belly facing your breast/body (belly to belly). If your baby is extremely fussy and crying, do skin-to-skin for a while until he or she calms down, then try (or try again).   Fine tune latch:  By holding your baby more securely at the breast, assisting your baby to stay attached by:  Support your baby with your hand behind the shoulder blades (NOT THE HEAD).  1. Align your baby's NOSE with your nipple  2. Your baby's chin should be the  "first part of his or her body to touch your breast  3. Tickle the baby's upper lip or nose with your nipple  4. When your baby's mouth is WIDE OPEN, swiftly bring your baby's mouth over your nipple/areola.  Steps 2 and 3 could be slightly reversed order or essentially happening at the same time.  Bringing your baby to your breast, not breast to the baby  Your baby's cheek to touch breast securely, nose tipped back  Hold your baby firmly in place so when your baby forgets to suck and picks it back up again your baby is in the correct spot. You will be extinguishing behavior and replacing it with a deeper latch to stimulate suck and provide satisfaction at the breast.  Your baby needs as much breast as deep in the mouth as possible to allow your nipples to heal and for you more importantly to maximize efficiency at the breast  If that doesn’t help, you should make an appointment with me to re-evaluate.   Latch is asymmetrical, leading with the chin, getting the baby below the breast, as if offering a large \"deli alcides sandwich\".  Here is a video from OLESYA on the asymmetric latch- https://www.Yub.com/watch?v=0I-EDg7Dc29  Triple Feeding: A short term solution: Breast/bottle/pump: Consider mostly double pumping as baby prepares for more robust feeding sessions  If triple feeing, FIRST decide what you can do at that feeding and if you decide to double feed -pump and bottle, that is sufficient.  If you are going to offer the breast at that feeding:  nurse first and limit time on the breasts to 20+/- min total  finish with bottle  pump afterwards to finish emptying your breasts which will help increase milk supply  As baby becomes more effective, evidenced by not pumping much milk after a breastfeeding, we will create a plan to decrease pumping.  Use your own pumped milk, donor human milk, or formula.  Pumping Guidelines:  Both breasts   Pumping is effective but can quickly exhaust and overwhelm a new mother  Pump 8 " times in 24 hours  Bra    Consider a hands free bra - Simple Wishes is recommended.  Cake Maternity or Milkmaid for larger size bras  Type of Pump:  Spectra  Spectra Settings  Press letdown button when first starting         Cycle: 70 / Vacuum: L1 - L 5 (To comfort)  Once milk lets down, press letdown button again  Cycle: 54 / Vacuum: L1 - L12 (To comfort)  Always double pump  How long will vary woman to woman, typically 8-15 minutes, or 1-2 minutes after flow slows  Flange Fit: Freely moving nipple in the tunnel with some movement of the areola.  Today's evaluation indicates appropriate flange and a flange change would be appropriate if breast tissue isn't sealing  Caution with Breast Massage The word “Massage” means different things in the breastfeeding world. It is described and interpreted in so many different ways and unfortunately potentially harmful. The hands can be safe on a breast and  gentle to help in many ways but they also can be damaging when used in the wrong way.  Lymphatic drainage massage is appropriate,  open hand , lightly stroking only, and can be highly effective. The massage advice to knead , massage deeply , vibrate with marketed tools is  most concerning. Be gentle with this gland to not increase inflammation.   Increasing supply besides Galactogogues and Pumping:  Warmth  Relaxation   Physical, auditory narratives  Childbirth relaxation Techniques  Acupuncture and acupressure  [facility] [email or web address]  Vadim [place] [email or web address]  Nipple care:    May apply breastmilk  Moist-oily ointment after feeding/pumping, ie Lanolin nipple butter, coconut or olive oil, if desired/needed 2-3 times/day until nipples are healed  Lanolin contraindicated with wool sensitivity  You do not need to wash this off before pumping or feeding the baby  Hydrogels recommended  Hydrogels Soothies when you need to provide moisture, coconut oil is not thick  enough    Connect with other mothers:  Facebook:   [place] [facility]: https://www.[email or web address]/[place].[email or web address]  Well-Nourished Babies (Private group for questions and support): https://www.facebook.com/groups/424215238136653/  Breastfeeding [facility]  WEIGHT CHECKS  Tuesdays 10am - 11am. Women's Health at [address] [place] [address], [address] [place] [address], 3rd floor conference room  Check your baby's weight, do a feeding and see how your baby is growing, visit with other mothers, plan on a walk or coffee date after group.  Please wear a mask coming and going: you may remove it in the classroom  Due to space limitations - limit strollers please (New c/section moms please use your stroller).  We would love to have dads stay, but moms won't breastfeed if there are men in the room, sorry.  The room is generally scheduled for another event following group.  Please take all diapers with you   ONLINE SUPPORT GROUPS  [employer]partum [employer] (PSI) support groups are conducted using a mcif-mq-hrjl support model and are not intended for those experiencing a mental health crisis. Groups are 90 minutes (1.5 hours) in length. The first ~30 minutes is providing information, education, and establishing group guidelines. The next ~60 minutes is “talk time,” in which group members share and talk with each other. Group members must be present for the group guidelines before joining in the discussion or “talk time.”     In Conclusion:   Managing breastfeeding and milk supply is very dynamic,can be a complex and intimate journey, and is not one size fits all. When obstacles present themselves, it takes confidence, persistence and support. The rights of the child include optimal nutrition and mothers need help to make informed decisions. You  and your baby have been screened for biological, psychological, and social risk factors that might interfere with achieving your goals.  Support is critical. You are now the focus of our Breastfeeding  Medicine team; we are here to support your decisions and vision.     Follow up requires close monitoring in this time sensitive window of opportunity to establish milk supply and facilitate the learning of  breastfeeding.    Follow-up for infant weight check and dyad breastfeeding evaluation:  10--14 days   Please call [phone] our voicemail line or the front office to scheduled your next appointment.  Family is encouraged to e-mail or mychart us to update how the plan is working.    A total of 80 minutes, not including infant assessment time, with more than 50% was spent preparing to see the patient, obtaining and reviewing separately obtained history, performing a medically appropriate examination and evaluation, counseling and educating the family, referring and communicating with other health care professionals, documenting clinical information in the electronic health record, independently interpreting weighted feeds and infant growth results, communicating these results to the family and care coordination as detailed in the above note.       SATISH Merrill.

## 2024-01-05 ENCOUNTER — APPOINTMENT (OUTPATIENT)
Dept: URGENT CARE | Facility: CLINIC | Age: 39
End: 2024-01-05
Payer: COMMERCIAL

## 2024-01-30 ENCOUNTER — APPOINTMENT (RX ONLY)
Dept: URBAN - METROPOLITAN AREA CLINIC 20 | Facility: CLINIC | Age: 39
Setting detail: DERMATOLOGY
End: 2024-01-30

## 2024-01-30 DIAGNOSIS — L57.8 OTHER SKIN CHANGES DUE TO CHRONIC EXPOSURE TO NONIONIZING RADIATION: ICD-10-CM

## 2024-01-30 DIAGNOSIS — D18.0 HEMANGIOMA: ICD-10-CM

## 2024-01-30 DIAGNOSIS — L81.4 OTHER MELANIN HYPERPIGMENTATION: ICD-10-CM

## 2024-01-30 DIAGNOSIS — L82.1 OTHER SEBORRHEIC KERATOSIS: ICD-10-CM

## 2024-01-30 DIAGNOSIS — D22 MELANOCYTIC NEVI: ICD-10-CM

## 2024-01-30 PROBLEM — D22.5 MELANOCYTIC NEVI OF TRUNK: Status: ACTIVE | Noted: 2024-01-30

## 2024-01-30 PROBLEM — D23.61 OTHER BENIGN NEOPLASM OF SKIN OF RIGHT UPPER LIMB, INCLUDING SHOULDER: Status: ACTIVE | Noted: 2024-01-30

## 2024-01-30 PROBLEM — D18.01 HEMANGIOMA OF SKIN AND SUBCUTANEOUS TISSUE: Status: ACTIVE | Noted: 2024-01-30

## 2024-01-30 PROBLEM — D22.71 MELANOCYTIC NEVI OF RIGHT LOWER LIMB, INCLUDING HIP: Status: ACTIVE | Noted: 2024-01-30

## 2024-01-30 PROCEDURE — ? ADDITIONAL NOTES

## 2024-01-30 PROCEDURE — 99203 OFFICE O/P NEW LOW 30 MIN: CPT

## 2024-01-30 PROCEDURE — ? COUNSELING

## 2024-01-30 ASSESSMENT — LOCATION SIMPLE DESCRIPTION DERM
LOCATION SIMPLE: LEFT UPPER ARM
LOCATION SIMPLE: LEFT THIGH
LOCATION SIMPLE: RIGHT THIGH
LOCATION SIMPLE: LEFT FOREARM
LOCATION SIMPLE: RIGHT CHEEK
LOCATION SIMPLE: LEFT UPPER BACK
LOCATION SIMPLE: LEFT BUTTOCK
LOCATION SIMPLE: RIGHT FOREARM
LOCATION SIMPLE: RIGHT THIGH
LOCATION SIMPLE: RIGHT UPPER BACK
LOCATION SIMPLE: RIGHT UPPER BACK
LOCATION SIMPLE: RIGHT UPPER ARM
LOCATION SIMPLE: CHEST
LOCATION SIMPLE: LEFT PRETIBIAL REGION
LOCATION SIMPLE: LEFT CHEEK
LOCATION SIMPLE: RIGHT PRETIBIAL REGION

## 2024-01-30 ASSESSMENT — LOCATION DETAILED DESCRIPTION DERM
LOCATION DETAILED: LEFT ANTERIOR DISTAL THIGH
LOCATION DETAILED: RIGHT INFERIOR UPPER BACK
LOCATION DETAILED: LEFT PROXIMAL PRETIBIAL REGION
LOCATION DETAILED: LEFT ANTERIOR PROXIMAL UPPER ARM
LOCATION DETAILED: LEFT INFERIOR UPPER BACK
LOCATION DETAILED: LEFT INFERIOR CENTRAL MALAR CHEEK
LOCATION DETAILED: RIGHT SUPERIOR MEDIAL UPPER BACK
LOCATION DETAILED: RIGHT PROXIMAL PRETIBIAL REGION
LOCATION DETAILED: LEFT BUTTOCK
LOCATION DETAILED: RIGHT CENTRAL MALAR CHEEK
LOCATION DETAILED: RIGHT ANTERIOR PROXIMAL UPPER ARM
LOCATION DETAILED: RIGHT ANTERIOR DISTAL THIGH
LOCATION DETAILED: LEFT MEDIAL SUPERIOR CHEST
LOCATION DETAILED: LEFT DISTAL DORSAL FOREARM
LOCATION DETAILED: RIGHT DISTAL DORSAL FOREARM
LOCATION DETAILED: RIGHT SUPERIOR UPPER BACK
LOCATION DETAILED: RIGHT ANTERIOR DISTAL THIGH
LOCATION DETAILED: RIGHT MID-UPPER BACK

## 2024-01-30 ASSESSMENT — LOCATION ZONE DERM
LOCATION ZONE: LEG
LOCATION ZONE: LEG
LOCATION ZONE: ARM
LOCATION ZONE: FACE
LOCATION ZONE: TRUNK
LOCATION ZONE: TRUNK

## 2024-01-30 NOTE — PROCEDURE: ADDITIONAL NOTES
Additional Notes: Pt will return to clinic for bx of 4 dysplastic nevi
Detail Level: Simple
Render Risk Assessment In Note?: no

## 2024-05-03 ENCOUNTER — APPOINTMENT (RX ONLY)
Dept: URBAN - METROPOLITAN AREA CLINIC 20 | Facility: CLINIC | Age: 39
Setting detail: DERMATOLOGY
End: 2024-05-03

## 2024-05-03 DIAGNOSIS — D22 MELANOCYTIC NEVI: ICD-10-CM

## 2024-05-03 PROBLEM — D23.61 OTHER BENIGN NEOPLASM OF SKIN OF RIGHT UPPER LIMB, INCLUDING SHOULDER: Status: ACTIVE | Noted: 2024-05-03

## 2024-05-03 PROBLEM — D22.61 MELANOCYTIC NEVI OF RIGHT UPPER LIMB, INCLUDING SHOULDER: Status: ACTIVE | Noted: 2024-05-03

## 2024-05-03 PROBLEM — D48.5 NEOPLASM OF UNCERTAIN BEHAVIOR OF SKIN: Status: ACTIVE | Noted: 2024-05-03

## 2024-05-03 PROCEDURE — 99213 OFFICE O/P EST LOW 20 MIN: CPT | Mod: 25

## 2024-05-03 PROCEDURE — ? BIOPSY BY SHAVE METHOD

## 2024-05-03 PROCEDURE — 11102 TANGNTL BX SKIN SINGLE LES: CPT

## 2024-05-03 PROCEDURE — 11103 TANGNTL BX SKIN EA SEP/ADDL: CPT

## 2024-05-03 PROCEDURE — ? COUNSELING

## 2024-05-03 ASSESSMENT — LOCATION ZONE DERM
LOCATION ZONE: LEG
LOCATION ZONE: TRUNK
LOCATION ZONE: LEG
LOCATION ZONE: TRUNK
LOCATION ZONE: ARM

## 2024-05-03 ASSESSMENT — LOCATION SIMPLE DESCRIPTION DERM
LOCATION SIMPLE: LEFT UPPER BACK
LOCATION SIMPLE: RIGHT UPPER BACK
LOCATION SIMPLE: LEFT BUTTOCK
LOCATION SIMPLE: RIGHT UPPER BACK
LOCATION SIMPLE: RIGHT THIGH
LOCATION SIMPLE: LEFT BUTTOCK
LOCATION SIMPLE: RIGHT UPPER ARM
LOCATION SIMPLE: LEFT LOWER BACK
LOCATION SIMPLE: RIGHT THIGH

## 2024-05-03 ASSESSMENT — LOCATION DETAILED DESCRIPTION DERM
LOCATION DETAILED: RIGHT ANTERIOR DISTAL THIGH
LOCATION DETAILED: RIGHT ANTERIOR DISTAL THIGH
LOCATION DETAILED: LEFT SUPERIOR LATERAL MIDBACK
LOCATION DETAILED: LEFT BUTTOCK
LOCATION DETAILED: RIGHT SUPERIOR UPPER BACK
LOCATION DETAILED: LEFT INFERIOR UPPER BACK
LOCATION DETAILED: LEFT BUTTOCK
LOCATION DETAILED: RIGHT SUPERIOR UPPER BACK
LOCATION DETAILED: RIGHT ANTERIOR PROXIMAL UPPER ARM

## 2024-05-03 NOTE — HPI: CLINICALLY ATYPICAL NEVI (MULTIPLE LESIONS)
How Many Lesions Are To Be Evaluated: other
Is This A New Presentation, Or A Follow-Up?: Atypical Nevi Monitoring

## 2024-05-13 ENCOUNTER — OFFICE VISIT (OUTPATIENT)
Dept: RHEUMATOLOGY | Facility: MEDICAL CENTER | Age: 39
End: 2024-05-13
Attending: STUDENT IN AN ORGANIZED HEALTH CARE EDUCATION/TRAINING PROGRAM
Payer: COMMERCIAL

## 2024-05-13 VITALS
WEIGHT: 209 LBS | HEIGHT: 67 IN | BODY MASS INDEX: 32.8 KG/M2 | HEART RATE: 78 BPM | TEMPERATURE: 97.3 F | SYSTOLIC BLOOD PRESSURE: 128 MMHG | DIASTOLIC BLOOD PRESSURE: 68 MMHG | OXYGEN SATURATION: 98 %

## 2024-05-13 DIAGNOSIS — R76.8 SEROLOGIC AUTOIMMUNITY: Primary | ICD-10-CM

## 2024-05-13 DIAGNOSIS — I73.00 RAYNAUD'S SYNDROME WITHOUT GANGRENE: ICD-10-CM

## 2024-05-13 DIAGNOSIS — I82.90 VENOUS THROMBOSIS: ICD-10-CM

## 2024-05-13 DIAGNOSIS — M25.50 POLYARTHRALGIA: ICD-10-CM

## 2024-05-13 PROCEDURE — 99204 OFFICE O/P NEW MOD 45 MIN: CPT | Performed by: STUDENT IN AN ORGANIZED HEALTH CARE EDUCATION/TRAINING PROGRAM

## 2024-05-13 PROCEDURE — 3078F DIAST BP <80 MM HG: CPT | Performed by: STUDENT IN AN ORGANIZED HEALTH CARE EDUCATION/TRAINING PROGRAM

## 2024-05-13 PROCEDURE — 3074F SYST BP LT 130 MM HG: CPT | Performed by: STUDENT IN AN ORGANIZED HEALTH CARE EDUCATION/TRAINING PROGRAM

## 2024-05-13 RX ORDER — ASPIRIN 81 MG/1
81 TABLET, CHEWABLE ORAL DAILY
COMMUNITY

## 2024-05-13 RX ORDER — LISINOPRIL 10 MG/1
1 TABLET ORAL
COMMUNITY
Start: 2023-11-01

## 2024-05-13 ASSESSMENT — RHEUMATOLOGY NEW PATIENT QUESTIONNAIRE
MARK ALL THE AREAS OF PAIN: 101206183
COLD-INDUCED COLOR CHANGES (WHITE, PURPLE, RED ON REWARMING): TOES
STICKING IN THROAT: Y
MUSCLE PAIN: Y
BODY ACHES: Y
NUMBNESS: Y
DURATION: <30 MINS
JOINT SWELLING: Y
RATE_1TO10: 5
LIST CURRENT PROBLEMS: SLEEP APNEA
HAIR SHEDDING: Y
CHARACTERISTIC: BETTER WITH ACTIVITY
ALCOHOL TOBACCO OR RECREATIONAL DRUGS: SOCIALLY
NASAL CONGESTION: Y
SNORING: Y
PELVIC PAIN: Y
LYMPH NODE SWELLING: NECK
COLD-INDUCED COLOR CHANGES (WHITE, PURPLE, RED ON REWARMING): FINGERS
JOINT PAIN: SAME WITH ACTIVITY

## 2024-05-13 ASSESSMENT — FIBROSIS 4 INDEX: FIB4 SCORE: 1.724137931034482759

## 2024-05-13 NOTE — PROGRESS NOTES
[facility] RHEUMATOLOGY  [address], [address], [place] [address]  Phone: [phone] ? Fax: [phone]  [facility]/Health-Services/Rheumatology    NEW PATIENT VISIT NOTE      DATE OF SERVICE: 05/13/2024         Subjective     REFERRING PRACTITIONER:  VIMAL Reyes  [address]  [facility] [id number]  [place] [id number]    PATIENT IDENTIFICATION:  Vinita Fields  [address] [place] [address]    YOB: 1985    MEDICAL RECORD NUMBER: [id number]         CHIEF COMPLAINT:   Chief Complaint   Patient presents with    New Patient     Positive JACKIE       HISTORY OF PRESENT ILLNESS:  Vinita Fields is a 39 y.o. female with pertinent history notable for hypertension, venous thrombosis (2012), EULALIA, and polyarthralgia, who presents for rheumatologic evaluation in the setting of positive JACKIE.     Patient went for a routine annual checkup with her PCP and complained of joint pains in the fingers in addition to swelling so JACKIE was obtained with positive results.  She reports onset of pain in the hands (CMCs, PIPs, DIPs, and right wrist) and hips in 10/2023 which worsened after the birth of her daughter (born in 10/2022).  When hand pain is severe, she finds it difficult to open things or hold a fork or pen.  She also has chronic low back pain and has done chiropractic adjustments with no significant improvement.  Back pain is present all the time, aggravated with prolonged sitting or standing.  Other associated symptoms include pain on the bottom of the feet (arch and toes) when she first wakes up in the morning, associated with tingling sensation.  Joint pains have overall improved since 2023.  She was not taking analgesics.  Reports <30 minutes of whole body stiffness that may actually concentrate more from the hips downwards.  Stiffness improves somewhat as the day goes along.  Reports longstanding history of purplish discoloration of the lips and paleness of the  fingers and toes when in the cold since elementary school.  She has not noticed worsening symptoms.  Reports a history of DVT/PE in 2012 when she was in [place].  The thrombosis occurred following an ankle surgery and she was also on birth control at the time.  Workup for antiphospholipid syndrome was negative.  She denies history of stroke or multiple miscarriages.  She had several rounds of IUI and one round of IVF which were all ineffective however, became pregnant naturally afterwards.  No skin thickening, esophageal disease, photosensitive/malar rash, sicca symptoms, episodes of red painful photophobic eyes, nasal/genital/nonpainful oral ulcerations, nonscarring alopecia, pleuritic chest pains, dyspnea on exertion or history of psoriasis.    Reports other aspects of symptoms and medical history as noted on the questionnaire below or scanned under media tab.  Pertinent treatments: No analgesics  Pertinent positive labs: 10/2023; JACKIE direct, anti-dsDNA (mildly elevated at 11, range 0-9), RF mildly elevated at 23  Pertinent negative labs: 10/2023; anti-RNP, anti-Palmer, anti-SCL 70, anti-SSA/SSB, antichromatin, anti-Jo1, anticentromere, ESR/CRP, anti-TPO, antithyroglobulin, TSH/free T4, vitamin D, CBC with differential, AST/AST, creatinine (though very mildly elevated at 1.02)  Pertinent imaging:   No [facility] x-rays available.    [facility] New [facility] Form    5/13/2024 11:12 AM PDT - Filed by Patient   Demographic Information   Marital Status:    Occupation:    Highest Level of Education: some college   MAIN REASON FOR VISIT bloodwork results referral from doc   HISTORY OF PRESENT ILLNESS   Date of symptom onset: unknown   Preceding incident/ailment:    Describe/list your symptoms: joint/body pains.  Blood work AR factor was on the high end and doc referred me.   Exacerbating factors:    Alleviating factors:    Helpful medications:    Ineffective medications:    Severity of pain (scale  of 1-10): 5   Personal/emotional stressors:    [place] All The Areas Of Pain    REVIEW OF SYMPTOMS    General   Fevers    Chills    Night sweats    Malaise    Fatigue    Unintentional weight loss    Musculoskeletal   Joint pain Same with activity   Morning stiffness duration <30 mins   Morning stiffness characteristic Better with activity   Joint swelling Yes   Joint instability    Tendon pain    Muscle pain Yes   Body aches Yes   Dermatologic   Hair loss with bald spots    Hair shedding Yes   Skin thickening    Skin plaques    Sunlight-induced skin rash    Cold-induced color changes (white, purple, red on rewarming) Fingers;  Toes   Neurologic/Psychiatric   Weakness    Spasms    Tingling    Burning    Numbness Yes   Insomnia    Anxiety    Depression    Head/Eyes   Headaches    Temple pain    Dizziness    Dry eyes    Eye pain    Eye redness    Blurry vision    Vision loss    Ears/Nose   Ear pain    Ringing in ears    Vertigo    Hearing loss    Nasal ulcers    Nosebleeds    Sinus pain    Nasal congestion Yes   Snoring Yes   Mouth/Throat   Oral ulcers    Bleeding gums    Dry mouth    Cavities    Sore throat    Sticking in throat Yes   Difficulty speaking    Neck/Lymphatics   Thyroid pain    Thyroid swelling    Lymph node swelling Neck   Cardiac/Respiratory   Chest pain with breathing    Dry cough    Cough with bloody phlegm    Shortness of breath    Fast heartbeats    Irregular heartbeats    Gastrointestinal   Nausea    Vomiting    Difficulty swallowing    Heartburn    Abdominal pain    Bloody stool    Mucus stool    Genitourinary   Pelvic pain Yes   Genital ulcers    Abnormal discharge    Burning urination    Frothy urine    Blood in urine    MEDICAL/PERSONAL HISTORY DETAILS   Current Medical Problems: sleep apnea   Past/Resolved Medical Problems: DVT & PE   Surgeries/Procedures (include month/year): ankle surgery, vena cava filter implantation, laprascopic ovary surgery, sinus surgery,     Prescription/Over-The-Counter/Herbal Medications: Linisopril 10mg, Aspirin 81mg, Valacyclovir 500mg, Zyrtec, Krill Oil 4,000mg, women's multi vitamin   Medication/Food/Material Allergies (include reactions): Melon   Immunizations (include month/year)    Alcohol/Tobacco/Recreational Drugs: socially   Family Medical History (father, mother, siblings only): Mother Type 1 Diabetic, renal failure, heart disease, congestive heart failure, high blood pressure                   Father Colon Cancer, Polycystic Kidney Disorder, Asthma.  Sister Asthma, celiac disease       REVIEW OF SYSTEMS:  Except as noted in the history above, relevant review of systems with emphasis on autoimmune rheumatic diseases was otherwise negative.    ACTIVE PROBLEM LIST:  Polyarthralgia    PAST MEDICAL HISTORY:  Hypertension  DVT/PE ()  Polyarthralgia  Obstructive sleep apnea    PAST SURGICAL HISTORY:  Past Surgical History:   Procedure Laterality Date    PRIMARY C SECTION N/A 2022    Procedure:  SECTION, PRIMARY;  Surgeon: Stephanie Mauricio M.D.;  Location: [facility];  Service: Labor and Delivery   Ankle (tendon repair)  Sinus surgery  Cyst removal    MEDICATIONS:  Current Outpatient Medications   Medication Sig    lisinopril (PRINIVIL) 10 MG Tab Take 1 Tablet by mouth every day.    aspirin (ASA) 81 MG Chew Tab chewable tablet Chew 81 mg every day.    multivitamin Tab Take 1 Tablet by mouth every day.    valACYclovir (VALTREX) 500 MG Tab     labetalol (NORMODYNE) 100 MG Tab Take 100 mg by mouth 2 times a day.       ALLERGIES: Not on File    IMMUNIZATIONS:   Immunization History   Administered Date(s) Administered    PFIZER PURPLE CAP SARS-COV-2 VACCINATION (12+) 2021, 2021       SOCIAL HISTORY:   Social History     Socioeconomic History    Marital status:    Tobacco Use    Smoking status: Former     Current packs/day: 0.00     Types: Cigarettes     Quit date:      Years since quittin.3  "   Smokeless tobacco: Never   Substance and Sexual Activity    Alcohol use: Not Currently    Drug use: Never    Sexual activity: Yes     Partners: Male       FAMILY HISTORY:  No family history on file.         Objective     Vital Signs: /68 (BP Location: Left arm, Patient Position: Sitting, BP Cuff Size: Adult)   Pulse 78   Temp 36.3 °C (97.3 °F) (Temporal)   Ht 1.702 m (5' 7\")   Wt 94.8 kg (209 lb)   SpO2 98% Body mass index is 32.73 kg/m².    General: Appears well and comfortable  Eyes: No scleral or conjunctival lesions  ENT: No apparent oral or nasal lesions.  No xerostomia.  Head/Neck: No apparent scalp or neck lesions.  No parotid swelling  Cardiovascular: Regular rate and rhythm; no pericardial rubs  Respiratory: Breathing quiet and unlabored; no rales or pleural rubs  Gastrointestinal: No apparent organomegaly or abdominal masses  Integumentary:   No palpable purpura  No heliotrope rash, Gottron's papules, V sign rash, or shawl sign rash  No significant telangiectasias  No sclerodactyly  No trophic complications such as digital ulcerations, pitting, fissuring, gangrene and /or infarcts of the fingertips   Musculoskeletal: No significant joint tenderness, periarticular soft tissue swelling, warmth, erythema, or overt synovitis; no significant restriction in range of motion of joints examined  Neurologic: No focal sensory or motor deficits  Psychiatric: Mood and affect appropriate    LABORATORY RESULTS REVIEWED AND INTERPRETED BY ME:  Lab Results   Component Value Date/Time    URICACID 5.0 11/26/2022 09:00 AM     Lab Results   Component Value Date/Time    PROTHROMBTM 12.1 11/26/2022 09:00 AM    INR 0.90 11/26/2022 09:00 AM     Lab Results   Component Value Date/Time    WBC 8.3 11/27/2022 10:06 AM    RBC 3.98 (L) 11/27/2022 10:06 AM    HEMOGLOBIN 12.2 11/27/2022 10:06 AM    HEMATOCRIT 36.1 (L) 11/27/2022 10:06 AM    MCV 90.7 11/27/2022 10:06 AM    MCH 30.7 11/27/2022 10:06 AM    MCHC 33.8 11/27/2022 " 10:06 AM    RDW 45.8 11/27/2022 10:06 AM    PLATELETCT 145 (L) 11/27/2022 10:06 AM    MPV 12.3 11/27/2022 10:06 AM    NEUTS 5.03 11/26/2022 09:00 AM    LYMPHOCYTES 18.00 (L) 11/26/2022 09:00 AM    MONOCYTES 8.10 11/26/2022 09:00 AM    EOSINOPHILS 0.40 11/26/2022 09:00 AM    BASOPHILS 0.40 11/26/2022 09:00 AM     Lab Results   Component Value Date/Time    ASTSGOT 25 11/27/2022 10:06 AM    ALTSGPT 16 11/27/2022 10:06 AM    ALKPHOSPHAT 64 11/27/2022 10:06 AM    TBILIRUBIN 0.2 11/27/2022 10:06 AM    TOTPROTEIN 6.0 11/27/2022 10:06 AM    ALBUMIN 3.1 (L) 11/27/2022 10:06 AM     Lab Results   Component Value Date/Time    SODIUM 132 (L) 11/27/2022 10:06 AM    POTASSIUM 4.1 11/27/2022 10:06 AM    CHLORIDE 101 11/27/2022 10:06 AM    CO2 20 11/27/2022 10:06 AM    GLUCOSE 111 (H) 11/27/2022 10:06 AM    BUN 11 11/27/2022 10:06 AM    CREATININE 0.67 11/27/2022 10:06 AM    CALCIUM 8.4 (L) 11/27/2022 10:06 AM     Lab Results   Component Value Date/Time    TOTPROTUR 8.0 11/26/2022 09:30 AM    CREATININEU 45.38 11/26/2022 09:30 AM     Lab Results   Component Value Date/Time    HEPBSAG Non-Reactive 05/13/2022 02:25 PM    HEPCAB Non-Reactive 05/13/2022 02:25 PM     RADIOLOGY RESULTS REVIEWED AND INTERPRETED BY ME: See above    All relevant laboratory and imaging results reported on this note were reviewed and interpreted by me.         Assessment & Plan     Vinita Fields is a 39 y.o. female with history as noted above whose presentation merits the following clinical impressions and recommendations:    1. Serologic autoimmunity  JACKIE direct, anti-dsDNA (mildly elevated at 11, range 0-9), RF mildly elevated at 23  Serologic evidence of immunologic activity without meeting the diagnostic criteria for rheumatoid arthritis or any JACKIE-associated autoimmune disease, such as Sjogren syndrome or systemic lupus. Notably, the JACKIE test is highly sensitive and lacks diagnostic specificity as it can be seen in a variety of non-rheumatic  conditions, such as acute or chronic bacterial or viral infections (presumably via molecular mimicry), autoimmune thyroid disease (Hashimoto's thyroiditis or Graves' disease), autoimmune hepatobiliary disease, inflammatory bowel disease, and lymphoproliferative disease or malignancy, as well as in over 10% of healthy individuals with no clinical evidence of autoimmune rheumatic disease. By the same token, positive RF may be detected in some individuals with certain bacterial or viral infections, inflammatory lung disease, primary biliary cholangitis, B-cell lymphomas, and in some healthy individuals, so does not always suggest the presence of underlying rheumatoid arthritis. That said, the presence of persistently positive autoantibodies, especially in high or rising titers, does confer some risk of the associated diseases, so if truly inflammatory symptoms develop and persist, clinical follow-up for re-evaluation would be reasonable. In this case, need to undertake the additional workup noted below for confirmatory, exclusionary, and risk stratification purposes.  - RHEUMATOID ARTHRITIS PANEL; Future  - HEPATITIS PANEL ACUTE(4 COMPONENTS); Future  - Quantiferon Gold Plus TB (4 tube); Future  - ANTINUCLEAR AB (JACKIE), HEP-2, IGG W/RFLX; Future  - ANTI-SMOOTH MUSCLE ABS; Future  - MITOCHONDRIAL (M2) AB; Future  - COMPLEMENT C3; Future  - COMPLEMENT C4; Future  - DX-JOINT SURVEY-FEET SINGLE VIEW; Future  - DX-JOINT SURVEY-HANDS SINGLE VIEW; Future    2. Polyarthralgia  Presentation is compatible with biomechanical arthralgia rather than inflammatory arthritis.    - Consider trial of Voltaren 1% gel 3-4 times daily as needed, topical lidocaine or capsaicin    3. Venous thrombosis  Diagnosed with PE and DVT in 2012, most likely provoked in the setting of ankle surgery and oral contraceptives.  Per patient, workup for antiphospholipid syndrome was negative.    4. Raynaud's syndrome without gangrene  Stable. The current  clinical evidence does not support the presence of an underlying autoimmune inflammatory disease mentioned above which can sometimes be associated and in that case would be considered secondary (has more trophic complications such as digital ulcerations, pitting, fissuring, gangrene and /or infarcts).    - Avoid exposure to cold temperature extremes and wear gloves/socks to keep hands/feet warm  - Consider a trial of topical or oral medication depending on severity    The above assessment and plan were discussed with the patient who acknowledged understanding of the plan.    FOLLOW-UP: Return TBD.         Thank you for the opportunity to participate in the care of Vinita Fields.    Shalonda Baltazar D.O.  Rheumatologist

## 2024-05-14 NOTE — PATIENT INSTRUCTIONS
Thank you for visiting our clinic today.     Summary of your visit:    Labs (more labs added) and x-rays today.    Consider trial of Voltaren 1% gel 3-4 times daily as needed, topical lidocaine or capsaicin.    - For Raynaud's; keep affected areas warm by wearing appropriately insulated clothing, gloves and footwear and avoid unprotected exposure to cold conditions  - More info provided below    Follow up TBD.       AFTER VISIT INSTRUCTIONS    Below are important information to help you navigate your healthcare needs and help us serve you safely and effectively:  If laboratory tests and/or imaging studies were ordered, remember to go get them done as instructed.  If new prescriptions and/or refills were sent, remember to go pick them up from your local pharmacy, or call the specialty pharmacy to request shipment.  Always take your prescription medications exactly as prescribed unless instructed otherwise.  Note that antirheumatic drugs and steroids are immunosuppressive which means they increase your risk of infections and have multiple potential adverse effects on various organ systems in your body, though most of them are uncommon.  It is important that you are up-to-date on age-appropriate immunizations, particularly shingles and bacterial/viral pneumonia vaccines, which you can request from me or your primary care provider.  Be sure to read the drug package inserts to learn about the potential side effects of your medications before you start taking them.  If you experience any significant drug side effects, stop taking the medication and notify me promptly, and depending on the severity of the side effects, consider going to an urgent care or emergency department for immediate attention.  If there are significant findings on your lab tests and imaging studies that warrant further action, I will notify you with explanations via MyChart or phone call, otherwise you can view them on [facility] and let me know if you  have any questions.  Note that [employer] messages are typically read during office hours and may take 1-7 business days before a response depending on the urgency of the situation and how busy my clinic schedule is.  In general, [employer] messaging is for non-urgent matters that do not require immediate attention, so for urgent matters that cannot wait, you are advised to go to an urgent care.  Lastly, you are granted [employer] access to my documentation of your visit and are encouraged to read my note which details my assessment and plan for your condition.  To learn more about your condition and rheumatic diseases evaluated and treated by rheumatologists, as well as gain access to many helpful resources about these diseases, visit our website at [email or web address].      For Raynaud's  - Avoid exposure to cold temperature extremes and wear gloves/socks to keep hands/feet warm  - Consider a trial of topical or oral medication depending on severity  - More info provided below    Raynaud’s Phenomenon (RP) results when there is a decrease in blood flow to the fingers and toes when someone is exposed to cold weather or stress, due to an exaggerated constriction in blood vessels. RP can also affect your ears, nose, and even nipples. The fingers or toes typically change color, and this can cause numbness, tingling, and pain. These symptoms occur intermittently and tend to resolve spontaneously on rewarming. RP can either be “primary” or “secondary”. Primary Raynaud’s is most common and typically affects women typically under the age of 30 and is not associated with any other condition. Secondary Raynaud’s is “secondary” to another condition such as lupus, scleroderma, rheumatoid arthritis, Sjogren’s syndrome, and thyroid disease. Rheumatologists help to differentiate whether Raynaud’s is primary or secondary in nature.    What Are the Signs/Symptoms?  One or multiple fingers/toes can initially turn  white due to decreased blood flow, and subsequently blue due to lack of oxygenation of the tissues. Upon rewarming, there is a sudden rush of blood to the affected areas causing the fingers/toes to become red in color. This is known as a triphasic color change which is classic for Raynaud’s. Symptoms can last for seconds to many minutes. If symptoms last for hours or are persistent despite attempts at rewarming, this can suggest more serious disease. Patients with severe Raynaud’s can develop ulcerations on their fingertips or toes due to tissue death resulting from lack of blood flow.    What Are Common Treatments?  Raynaud’s can be managed with both lifestyle modifications and medications. Patients can use mittens/gloves, thick socks, and insertable warmers to help keep their hands and feet warm. Stress reduction and smoking cessation are also recommended to help decrease Raynaud’s attacks. Medications such as calcium channel blockers (amlodipine, nifedipine, felodipine, and others) and angiotensin-receptor blockers increase blood flow to the fingers and toes. For patients with more severe symptoms or who have developed complications such as ulcers, other medications can be used including sildenafil or prostacyclins.    Living with Raynaud's Phenomenon  Wear warm, protective clothing like socks, boots, mittens or gloves in the fall and winter. In the summer months, avoid keeping the temperature of the AC too low. Wear oven mitts or gloves when taking food out of the freezer or refrigerator. Exercise regularly as this can boost healthy circulation and try to decrease stressful triggers. If symptoms occur, rewarm your body, place hands under the armpits, wiggle fingers and toes, run fingers or toes under warm (not hot) water, or massage the hands and feet.

## 2024-05-31 ENCOUNTER — APPOINTMENT (RX ONLY)
Dept: URBAN - METROPOLITAN AREA CLINIC 20 | Facility: CLINIC | Age: 39
Setting detail: DERMATOLOGY
End: 2024-05-31

## 2024-05-31 DIAGNOSIS — D22 MELANOCYTIC NEVI: ICD-10-CM

## 2024-05-31 PROBLEM — D22.71 MELANOCYTIC NEVI OF RIGHT LOWER LIMB, INCLUDING HIP: Status: ACTIVE | Noted: 2024-05-31

## 2024-05-31 PROCEDURE — 11402 EXC TR-EXT B9+MARG 1.1-2 CM: CPT

## 2024-05-31 PROCEDURE — 12032 INTMD RPR S/A/T/EXT 2.6-7.5: CPT

## 2024-05-31 PROCEDURE — ? EXCISION

## 2024-05-31 ASSESSMENT — LOCATION SIMPLE DESCRIPTION DERM: LOCATION SIMPLE: RIGHT THIGH

## 2024-05-31 ASSESSMENT — LOCATION ZONE DERM: LOCATION ZONE: LEG

## 2024-05-31 ASSESSMENT — LOCATION DETAILED DESCRIPTION DERM: LOCATION DETAILED: RIGHT ANTERIOR DISTAL THIGH

## 2024-05-31 NOTE — PROCEDURE: EXCISION
Medical Necessity Information: It is in your best interest to select a reason for this procedure from the list below. All of these items fulfill various CMS LCD requirements except lesion extends to a margin.
Include Z78.9 (Other Specified Conditions Influencing Health Status) As An Associated Diagnosis?: No
Medical Necessity Clause: This procedure was medically necessary because the lesion that was treated was:
Lab: [id number]
Lab Facility: 
Biopsy Photograph Reviewed: Yes
Size Of Lesion In Cm: 0.6
X Size Of Lesion In Cm (Optional): 0
Size Of Margin In Cm: 0.3
Eye Clamp Note Details: An eye clamp was used during the procedure.
Excision Method: Elliptical
Saucerization Depth: dermis and superficial adipose tissue
Repair Type: Intermediate
Intermediate / Complex Repair - Final Wound Length In Cm: 3.6
Suturegard Retention Suture: 2-0 Nylon
Retention Suture Bite Size: 3 mm
Length To Time In Minutes Device Was In Place: 10
Number Of Hemigard Strips Per Side: 1
Undermining Type: Entire Wound
Debridement Text: The wound edges were debrided prior to proceeding with the closure to facilitate wound healing.
Helical Rim Text: The closure involved the helical rim.
Vermilion Border Text: The closure involved the vermilion border.
Nostril Rim Text: The closure involved the nostril rim.
Retention Suture Text: Retention sutures were placed to support the closure and prevent dehiscence.
Suture Removal: 14 days
Epidermal Closure Graft Donor Site (Optional): simple interrupted
Graft Donor Site Bandage (Optional-Leave Blank If You Don't Want In Note): Steri-strips and a pressure bandage were applied to the donor site.
Detail Level: Detailed
Excision Depth: adipose tissue
Scalpel Size: 15 blade
Anesthesia Type: 1% lidocaine with epinephrine
Additional Anesthesia Volume In Cc: 6
Hemostasis: Electrocautery
Estimated Blood Loss (Cc): minimal
Repair Depth: use same depth as excision depth
Anesthesia Type: 1% lidocaine with epinephrine and a 1:10 solution of 8.4% sodium bicarbonate
Anesthesia Volume In Cc: 3
Deep Sutures: 3-0 Polysorb
Dermal Closure: buried vertical mattress
Epidermal Sutures: 4-0 Surgipro
Epidermal Closure: running
Wound Care: Petrolatum
Dressing: dry sterile dressing
Suturegard Intro: Intraoperative tissue expansion was performed, utilizing the SUTUREGARD device, in order to reduce wound tension.
Suturegard Body: The suture ends were repeatedly re-tightened and re-clamped to achieve the desired tissue expansion.
Hemigard Intro: Due to skin fragility and wound tension, it was decided to use HEMIGARD adhesive retention suture devices to permit a linear closure. The skin was cleaned and dried for a 6cm distance away from the wound. Excessive hair, if present, was removed to allow for adhesion.
Hemigard Postcare Instructions: The HEMIGARD strips are to remain completely dry for at least 5-7 days.
Positioning (Leave Blank If You Do Not Want): The patient was placed in a comfortable position exposing the surgical site.
Complex Repair Preamble Text (Leave Blank If You Do Not Want): Extensive wide undermining was performed.
Intermediate Repair Preamble Text (Leave Blank If You Do Not Want): Undermining was performed with blunt dissection.
Fusiform Excision Additional Text (Leave Blank If You Do Not Want): The margin was drawn around the clinically apparent lesion.  A fusiform shape was then drawn on the skin incorporating the lesion and margins.  Incisions were then made along these lines to the appropriate tissue plane and the lesion was extirpated.
Eliptical Excision Additional Text (Leave Blank If You Do Not Want): The margin was drawn around the clinically apparent lesion.  An elliptical shape was then drawn on the skin incorporating the lesion and margins.  Incisions were then made along these lines to the appropriate tissue plane and the lesion was extirpated.
Saucerization Excision Additional Text (Leave Blank If You Do Not Want): The margin was drawn around the clinically apparent lesion.  Incisions were then made along these lines, in a tangential fashion, to the appropriate tissue plane and the lesion was extirpated.
Slit Excision Additional Text (Leave Blank If You Do Not Want): A linear line was drawn on the skin overlying the lesion. An incision was made slowly until the lesion was visualized.  Once visualized, the lesion was removed with blunt dissection.
Excisional Biopsy Additional Text (Leave Blank If You Do Not Want): The margin was drawn around the clinically apparent lesion. An elliptical shape was then drawn on the skin incorporating the lesion and margins.  Incisions were then made along these lines to the appropriate tissue plane and the lesion was extirpated.
Perilesional Excision Additional Text (Leave Blank If You Do Not Want): The margin was drawn around the clinically apparent lesion. Incisions were then made along these lines to the appropriate tissue plane and the lesion was extirpated.
Repair Performed By Another Provider Text (Leave Blank If You Do Not Want): After the tissue was excised the defect was repaired by another provider.
No Repair - Repaired With Adjacent Surgical Defect Text (Leave Blank If You Do Not Want): After the excision the defect was repaired concurrently with another surgical defect which was in close approximation.
Adjacent Tissue Transfer Text: The defect edges were debeveled with a #15 scalpel blade. Given the location of the defect and the proximity to free margins an adjacent tissue transfer was deemed most appropriate. Using a sterile surgical marker, an appropriate flap was drawn incorporating the defect and placing the expected incisions within the relaxed skin tension lines where possible. The area thus outlined was incised deep to adipose tissue with a #15 scalpel blade. The skin margins were undermined to an appropriate distance in all directions utilizing iris scissors and carried over to close the primary defect.
Advancement Flap (Single) Text: The defect edges were debeveled with a #15 scalpel blade.  Given the location of the defect and the proximity to free margins a single advancement flap was deemed most appropriate.  Using a sterile surgical marker, an appropriate advancement flap was drawn incorporating the defect and placing the expected incisions within the relaxed skin tension lines where possible.    The area thus outlined was incised deep to adipose tissue with a #15 scalpel blade.  The skin margins were undermined to an appropriate distance in all directions utilizing iris scissors.
Advancement Flap (Double) Text: The defect edges were debeveled with a #15 scalpel blade.  Given the location of the defect and the proximity to free margins a double advancement flap was deemed most appropriate.  Using a sterile surgical marker, the appropriate advancement flaps were drawn incorporating the defect and placing the expected incisions within the relaxed skin tension lines where possible.    The area thus outlined was incised deep to adipose tissue with a #15 scalpel blade.  The skin margins were undermined to an appropriate distance in all directions utilizing iris scissors.
Burow's Advancement Flap Text: The defect edges were debeveled with a #15 scalpel blade.  Given the location of the defect and the proximity to free margins a Burow's advancement flap was deemed most appropriate.  Using a sterile surgical marker, the appropriate advancement flap was drawn incorporating the defect and placing the expected incisions within the relaxed skin tension lines where possible.    The area thus outlined was incised deep to adipose tissue with a #15 scalpel blade.  The skin margins were undermined to an appropriate distance in all directions utilizing iris scissors.
Chonodrocutaneous Helical Advancement Flap Text: The defect edges were debeveled with a #15 scalpel blade.  Given the location of the defect and the proximity to free margins a chondrocutaneous helical advancement flap was deemed most appropriate.  Using a sterile surgical marker, the appropriate advancement flap was drawn incorporating the defect and placing the expected incisions within the relaxed skin tension lines where possible.    The area thus outlined was incised deep to adipose tissue with a #15 scalpel blade.  The skin margins were undermined to an appropriate distance in all directions utilizing iris scissors.
Crescentic Advancement Flap Text: The defect edges were debeveled with a #15 scalpel blade.  Given the location of the defect and the proximity to free margins a crescentic advancement flap was deemed most appropriate.  Using a sterile surgical marker, the appropriate advancement flap was drawn incorporating the defect and placing the expected incisions within the relaxed skin tension lines where possible.    The area thus outlined was incised deep to adipose tissue with a #15 scalpel blade.  The skin margins were undermined to an appropriate distance in all directions utilizing iris scissors.
A-T Advancement Flap Text: The defect edges were debeveled with a #15 scalpel blade.  Given the location of the defect, shape of the defect and the proximity to free margins an A-T advancement flap was deemed most appropriate.  Using a sterile surgical marker, an appropriate advancement flap was drawn incorporating the defect and placing the expected incisions within the relaxed skin tension lines where possible.    The area thus outlined was incised deep to adipose tissue with a #15 scalpel blade.  The skin margins were undermined to an appropriate distance in all directions utilizing iris scissors.
O-T Advancement Flap Text: The defect edges were debeveled with a #15 scalpel blade.  Given the location of the defect, shape of the defect and the proximity to free margins an O-T advancement flap was deemed most appropriate.  Using a sterile surgical marker, an appropriate advancement flap was drawn incorporating the defect and placing the expected incisions within the relaxed skin tension lines where possible.    The area thus outlined was incised deep to adipose tissue with a #15 scalpel blade.  The skin margins were undermined to an appropriate distance in all directions utilizing iris scissors.
O-L Flap Text: The defect edges were debeveled with a #15 scalpel blade.  Given the location of the defect, shape of the defect and the proximity to free margins an O-L flap was deemed most appropriate.  Using a sterile surgical marker, an appropriate advancement flap was drawn incorporating the defect and placing the expected incisions within the relaxed skin tension lines where possible.    The area thus outlined was incised deep to adipose tissue with a #15 scalpel blade.  The skin margins were undermined to an appropriate distance in all directions utilizing iris scissors.
O-Z Flap Text: The defect edges were debeveled with a #15 scalpel blade.  Given the location of the defect, shape of the defect and the proximity to free margins an O-Z flap was deemed most appropriate.  Using a sterile surgical marker, an appropriate transposition flap was drawn incorporating the defect and placing the expected incisions within the relaxed skin tension lines where possible. The area thus outlined was incised deep to adipose tissue with a #15 scalpel blade.  The skin margins were undermined to an appropriate distance in all directions utilizing iris scissors.
Double O-Z Flap Text: The defect edges were debeveled with a #15 scalpel blade.  Given the location of the defect, shape of the defect and the proximity to free margins a Double O-Z flap was deemed most appropriate.  Using a sterile surgical marker, an appropriate transposition flap was drawn incorporating the defect and placing the expected incisions within the relaxed skin tension lines where possible. The area thus outlined was incised deep to adipose tissue with a #15 scalpel blade.  The skin margins were undermined to an appropriate distance in all directions utilizing iris scissors.
V-Y Flap Text: The defect edges were debeveled with a #15 scalpel blade.  Given the location of the defect, shape of the defect and the proximity to free margins a V-Y flap was deemed most appropriate.  Using a sterile surgical marker, an appropriate advancement flap was drawn incorporating the defect and placing the expected incisions within the relaxed skin tension lines where possible.    The area thus outlined was incised deep to adipose tissue with a #15 scalpel blade.  The skin margins were undermined to an appropriate distance in all directions utilizing iris scissors.
Advancement-Rotation Flap Text: The defect edges were debeveled with a #15 scalpel blade. Given the location of the defect, shape of the defect and the proximity to free margins an advancement-rotation flap was deemed most appropriate. Using a sterile surgical marker, an appropriate flap was drawn incorporating the defect and placing the expected incisions within the relaxed skin tension lines where possible. The area thus outlined was incised deep to adipose tissue with a #15 scalpel blade. The skin margins were undermined to an appropriate distance in all directions utilizing iris scissors. Following this, the designed flap was carried over into the primary defect and sutured into place.
Mercedes Flap Text: The defect edges were debeveled with a #15 scalpel blade.  Given the location of the defect, shape of the defect and the proximity to free margins a Mercedes flap was deemed most appropriate.  Using a sterile surgical marker, an appropriate advancement flap was drawn incorporating the defect and placing the expected incisions within the relaxed skin tension lines where possible. The area thus outlined was incised deep to adipose tissue with a #15 scalpel blade.  The skin margins were undermined to an appropriate distance in all directions utilizing iris scissors.
Modified Advancement Flap Text: The defect edges were debeveled with a #15 scalpel blade.  Given the location of the defect, shape of the defect and the proximity to free margins a modified advancement flap was deemed most appropriate.  Using a sterile surgical marker, an appropriate advancement flap was drawn incorporating the defect and placing the expected incisions within the relaxed skin tension lines where possible.    The area thus outlined was incised deep to adipose tissue with a #15 scalpel blade.  The skin margins were undermined to an appropriate distance in all directions utilizing iris scissors.
Mucosal Advancement Flap Text: Given the location of the defect, shape of the defect and the proximity to free margins a mucosal advancement flap was deemed most appropriate. Incisions were made with a 15 blade scalpel in the appropriate fashion along the cutaneous vermillion border and the mucosal lip. The remaining actinically damaged mucosal tissue was excised.  The mucosal advancement flap was then elevated to the gingival sulcus with care taken to preserve the neurovascular structures and advanced into the primary defect. Care was taken to ensure that precise realignment of the vermilion border was achieved.
Peng Advancement Flap Text: The defect edges were debeveled with a #15 scalpel blade. Given the location of the defect, shape of the defect and the proximity to free margins a Peng advancement flap was deemed most appropriate. Using a sterile surgical marker, an appropriate advancement flap was drawn incorporating the defect and placing the expected incisions within the relaxed skin tension lines where possible. The area thus outlined was incised deep to adipose tissue with a #15 scalpel blade. The skin margins were undermined to an appropriate distance in all directions utilizing iris scissors. Following this, the designed flap was advanced and carried over into the primary defect and sutured into place.
Hatchet Flap Text: The defect edges were debeveled with a #15 scalpel blade.  Given the location of the defect, shape of the defect and the proximity to free margins a hatchet flap was deemed most appropriate.  Using a sterile surgical marker, an appropriate hatchet flap was drawn incorporating the defect and placing the expected incisions within the relaxed skin tension lines where possible.    The area thus outlined was incised deep to adipose tissue with a #15 scalpel blade.  The skin margins were undermined to an appropriate distance in all directions utilizing iris scissors.
Rotation Flap Text: The defect edges were debeveled with a #15 scalpel blade.  Given the location of the defect, shape of the defect and the proximity to free margins a rotation flap was deemed most appropriate.  Using a sterile surgical marker, an appropriate rotation flap was drawn incorporating the defect and placing the expected incisions within the relaxed skin tension lines where possible.    The area thus outlined was incised deep to adipose tissue with a #15 scalpel blade.  The skin margins were undermined to an appropriate distance in all directions utilizing iris scissors.
Bilateral Rotation Flap Text: The defect edges were debeveled with a #15 scalpel blade. Given the location of the defect, shape of the defect and the proximity to free margins a bilateral rotation flap was deemed most appropriate. Using a sterile surgical marker, an appropriate rotation flap was drawn incorporating the defect and placing the expected incisions within the relaxed skin tension lines where possible. The area thus outlined was incised deep to adipose tissue with a #15 scalpel blade. The skin margins were undermined to an appropriate distance in all directions utilizing iris scissors. Following this, the designed flap was carried over into the primary defect and sutured into place.
Spiral Flap Text: The defect edges were debeveled with a #15 scalpel blade.  Given the location of the defect, shape of the defect and the proximity to free margins a spiral flap was deemed most appropriate.  Using a sterile surgical marker, an appropriate rotation flap was drawn incorporating the defect and placing the expected incisions within the relaxed skin tension lines where possible. The area thus outlined was incised deep to adipose tissue with a #15 scalpel blade.  The skin margins were undermined to an appropriate distance in all directions utilizing iris scissors.
Staged Advancement Flap Text: The defect edges were debeveled with a #15 scalpel blade. Given the location of the defect, shape of the defect and the proximity to free margins a staged advancement flap was deemed most appropriate. Using a sterile surgical marker, an appropriate advancement flap was drawn incorporating the defect and placing the expected incisions within the relaxed skin tension lines where possible. The area thus outlined was incised deep to adipose tissue with a #15 scalpel blade. The skin margins were undermined to an appropriate distance in all directions utilizing iris scissors. Following this, the designed flap was carried over into the primary defect and sutured into place.
Star Wedge Flap Text: The defect edges were debeveled with a #15 scalpel blade.  Given the location of the defect, shape of the defect and the proximity to free margins a star wedge flap was deemed most appropriate.  Using a sterile surgical marker, an appropriate rotation flap was drawn incorporating the defect and placing the expected incisions within the relaxed skin tension lines where possible. The area thus outlined was incised deep to adipose tissue with a #15 scalpel blade.  The skin margins were undermined to an appropriate distance in all directions utilizing iris scissors.
Transposition Flap Text: The defect edges were debeveled with a #15 scalpel blade.  Given the location of the defect and the proximity to free margins a transposition flap was deemed most appropriate.  Using a sterile surgical marker, an appropriate transposition flap was drawn incorporating the defect.    The area thus outlined was incised deep to adipose tissue with a #15 scalpel blade.  The skin margins were undermined to an appropriate distance in all directions utilizing iris scissors.
Muscle Hinge Flap Text: The defect edges were debeveled with a #15 scalpel blade.  Given the size, depth and location of the defect and the proximity to free margins a muscle hinge flap was deemed most appropriate.  Using a sterile surgical marker, an appropriate hinge flap was drawn incorporating the defect. The area thus outlined was incised with a #15 scalpel blade.  The skin margins were undermined to an appropriate distance in all directions utilizing iris scissors.
Mustarde Flap Text: The defect edges were debeveled with a #15 scalpel blade.  Given the size, depth and location of the defect and the proximity to free margins a Mustarde flap was deemed most appropriate. Using a sterile surgical marker, an appropriate flap was drawn incorporating the defect. The area thus outlined was incised with a #15 scalpel blade. The skin margins were undermined to an appropriate distance in all directions utilizing iris scissors. Following this, the designed flap was carried into the primary defect and sutured into place.
Nasal Turnover Hinge Flap Text: The defect edges were debeveled with a #15 scalpel blade.  Given the size, depth, location of the defect and the defect being full thickness a nasal turnover hinge flap was deemed most appropriate. Using a sterile surgical marker, an appropriate hinge flap was drawn incorporating the defect. The area thus outlined was incised with a #15 scalpel blade. The flap was designed to recreate the nasal mucosal lining and the alar rim. The skin margins were undermined to an appropriate distance in all directions utilizing iris scissors. Following this, the designed flap was carried over into the primary defect and sutured into place
Nasalis-Muscle-Based Myocutaneous Island Pedicle Flap Text: Using a #15 blade, an incision was made around the donor flap to the level of the nasalis muscle. Wide lateral undermining was then performed in both the subcutaneous plane above the nasalis muscle, and in a submuscular plane just above periosteum. This allowed the formation of a free nasalis muscle axial pedicle (based on the angular artery) which was still attached to the actual cutaneous flap, increasing its mobility and vascular viability. Hemostasis was obtained with pinpoint electrocoagulation. The flap was mobilized into position and the pivotal anchor points positioned and stabilized with buried interrupted sutures. Subcutaneous and dermal tissues were closed in a multilayered fashion with sutures. Tissue redundancies were excised, and the epidermal edges were apposed without significant tension and sutured with sutures.
Nasalis Myocutaneous Flap Text: Using a #15 blade, an incision was made around the donor flap to the level of the nasalis muscle. Wide lateral undermining was then performed in both the subcutaneous plane above the nasalis muscle, and in a submuscular plane just above periosteum. This allowed the formation of a free nasalis muscle axial pedicle which was still attached to the actual cutaneous flap, increasing its mobility and vascular viability. Hemostasis was obtained with pinpoint electrocoagulation. The flap was mobilized into position and the pivotal anchor points positioned and stabilized with buried interrupted sutures. Subcutaneous and dermal tissues were closed in a multilayered fashion with sutures. Tissue redundancies were excised, and the epidermal edges were apposed without significant tension and sutured with sutures.
Orbicularis Oris Muscle Flap Text: The defect edges were debeveled with a #15 scalpel blade.  Given that the defect affected the competency of the oral sphincter an orbicularis oris muscle flap was deemed most appropriate to restore this competency and normal muscle function.  Using a sterile surgical marker, an appropriate flap was drawn incorporating the defect. The area thus outlined was incised with a #15 scalpel blade. Following this, the designed flap was carried over into the primary defect and sutured into place.
Melolabial Transposition Flap Text: The defect edges were debeveled with a #15 scalpel blade.  Given the location of the defect and the proximity to free margins a melolabial flap was deemed most appropriate.  Using a sterile surgical marker, an appropriate melolabial transposition flap was drawn incorporating the defect.    The area thus outlined was incised deep to adipose tissue with a #15 scalpel blade.  The skin margins were undermined to an appropriate distance in all directions utilizing iris scissors.
Rectangular Flap Text: The defect edges were debeveled with a #15 scalpel blade. Given the location of the defect and the proximity to free margins a rectangular flap was deemed most appropriate. Using a sterile surgical marker, an appropriate rectangular flap was drawn incorporating the defect. The area thus outlined was incised deep to adipose tissue with a #15 scalpel blade. The skin margins were undermined to an appropriate distance in all directions utilizing iris scissors. Following this, the designed flap was carried over into the primary defect and sutured into place.
Rhombic Flap Text: The defect edges were debeveled with a #15 scalpel blade.  Given the location of the defect and the proximity to free margins a rhombic flap was deemed most appropriate.  Using a sterile surgical marker, an appropriate rhombic flap was drawn incorporating the defect.    The area thus outlined was incised deep to adipose tissue with a #15 scalpel blade.  The skin margins were undermined to an appropriate distance in all directions utilizing iris scissors.
Rhomboid Transposition Flap Text: The defect edges were debeveled with a #15 scalpel blade.  Given the location of the defect and the proximity to free margins a rhomboid transposition flap was deemed most appropriate.  Using a sterile surgical marker, an appropriate rhomboid flap was drawn incorporating the defect.    The area thus outlined was incised deep to adipose tissue with a #15 scalpel blade.  The skin margins were undermined to an appropriate distance in all directions utilizing iris scissors.
Bi-Rhombic Flap Text: The defect edges were debeveled with a #15 scalpel blade.  Given the location of the defect and the proximity to free margins a bi-rhombic flap was deemed most appropriate.  Using a sterile surgical marker, an appropriate rhombic flap was drawn incorporating the defect. The area thus outlined was incised deep to adipose tissue with a #15 scalpel blade.  The skin margins were undermined to an appropriate distance in all directions utilizing iris scissors.
Helical Rim Advancement Flap Text: The defect edges were debeveled with a #15 blade scalpel.  Given the location of the defect and the proximity to free margins (helical rim) a double helical rim advancement flap was deemed most appropriate.  Using a sterile surgical marker, the appropriate advancement flaps were drawn incorporating the defect and placing the expected incisions between the helical rim and antihelix where possible.  The area thus outlined was incised through and through with a #15 scalpel blade.  With a skin hook and iris scissors, the flaps were gently and sharply undermined and freed up.
Bilateral Helical Rim Advancement Flap Text: The defect edges were debeveled with a #15 blade scalpel.  Given the location of the defect and the proximity to free margins (helical rim) a bilateral helical rim advancement flap was deemed most appropriate.  Using a sterile surgical marker, the appropriate advancement flaps were drawn incorporating the defect and placing the expected incisions between the helical rim and antihelix where possible.  The area thus outlined was incised through and through with a #15 scalpel blade.  With a skin hook and iris scissors, the flaps were gently and sharply undermined and freed up.
Ear Star Wedge Flap Text: The defect edges were debeveled with a #15 blade scalpel.  Given the location of the defect and the proximity to free margins (helical rim) an ear star wedge flap was deemed most appropriate.  Using a sterile surgical marker, the appropriate flap was drawn incorporating the defect and placing the expected incisions between the helical rim and antihelix where possible.  The area thus outlined was incised through and through with a #15 scalpel blade.
Banner Transposition Flap Text: The defect edges were debeveled with a #15 scalpel blade.  Given the location of the defect and the proximity to free margins a Banner transposition flap was deemed most appropriate.  Using a sterile surgical marker, an appropriate flap drawn around the defect. The area thus outlined was incised deep to adipose tissue with a #15 scalpel blade.  The skin margins were undermined to an appropriate distance in all directions utilizing iris scissors.
Bilobed Flap Text: The defect edges were debeveled with a #15 scalpel blade.  Given the location of the defect and the proximity to free margins a bilobe flap was deemed most appropriate.  Using a sterile surgical marker, an appropriate bilobe flap drawn around the defect.    The area thus outlined was incised deep to adipose tissue with a #15 scalpel blade.  The skin margins were undermined to an appropriate distance in all directions utilizing iris scissors.
Bilobed Transposition Flap Text: The defect edges were debeveled with a #15 scalpel blade.  Given the location of the defect and the proximity to free margins a bilobed transposition flap was deemed most appropriate.  Using a sterile surgical marker, an appropriate bilobe flap drawn around the defect.    The area thus outlined was incised deep to adipose tissue with a #15 scalpel blade.  The skin margins were undermined to an appropriate distance in all directions utilizing iris scissors.
Trilobed Flap Text: The defect edges were debeveled with a #15 scalpel blade.  Given the location of the defect and the proximity to free margins a trilobed flap was deemed most appropriate.  Using a sterile surgical marker, an appropriate trilobed flap drawn around the defect.    The area thus outlined was incised deep to adipose tissue with a #15 scalpel blade.  The skin margins were undermined to an appropriate distance in all directions utilizing iris scissors.
Dorsal Nasal Flap Text: The defect edges were debeveled with a #15 scalpel blade.  Given the location of the defect and the proximity to free margins a dorsal nasal flap was deemed most appropriate.  Using a sterile surgical marker, an appropriate dorsal nasal flap was drawn around the defect.    The area thus outlined was incised deep to adipose tissue with a #15 scalpel blade.  The skin margins were undermined to an appropriate distance in all directions utilizing iris scissors.
Island Pedicle Flap Text: The defect edges were debeveled with a #15 scalpel blade.  Given the location of the defect, shape of the defect and the proximity to free margins an island pedicle advancement flap was deemed most appropriate.  Using a sterile surgical marker, an appropriate advancement flap was drawn incorporating the defect, outlining the appropriate donor tissue and placing the expected incisions within the relaxed skin tension lines where possible.    The area thus outlined was incised deep to adipose tissue with a #15 scalpel blade.  The skin margins were undermined to an appropriate distance in all directions around the primary defect and laterally outward around the island pedicle utilizing iris scissors.  There was minimal undermining beneath the pedicle flap.
Island Pedicle Flap With Canthal Suspension Text: The defect edges were debeveled with a #15 scalpel blade.  Given the location of the defect, shape of the defect and the proximity to free margins an island pedicle advancement flap was deemed most appropriate.  Using a sterile surgical marker, an appropriate advancement flap was drawn incorporating the defect, outlining the appropriate donor tissue and placing the expected incisions within the relaxed skin tension lines where possible. The area thus outlined was incised deep to adipose tissue with a #15 scalpel blade.  The skin margins were undermined to an appropriate distance in all directions around the primary defect and laterally outward around the island pedicle utilizing iris scissors.  There was minimal undermining beneath the pedicle flap. A suspension suture was placed in the canthal tendon to prevent tension and prevent ectropion.
Alar Island Pedicle Flap Text: The defect edges were debeveled with a #15 scalpel blade.  Given the location of the defect, shape of the defect and the proximity to the alar rim an island pedicle advancement flap was deemed most appropriate.  Using a sterile surgical marker, an appropriate advancement flap was drawn incorporating the defect, outlining the appropriate donor tissue and placing the expected incisions within the nasal ala running parallel to the alar rim. The area thus outlined was incised with a #15 scalpel blade.  The skin margins were undermined minimally to an appropriate distance in all directions around the primary defect and laterally outward around the island pedicle utilizing iris scissors.  There was minimal undermining beneath the pedicle flap.
Double Island Pedicle Flap Text: The defect edges were debeveled with a #15 scalpel blade.  Given the location of the defect, shape of the defect and the proximity to free margins a double island pedicle advancement flap was deemed most appropriate.  Using a sterile surgical marker, an appropriate advancement flap was drawn incorporating the defect, outlining the appropriate donor tissue and placing the expected incisions within the relaxed skin tension lines where possible.    The area thus outlined was incised deep to adipose tissue with a #15 scalpel blade.  The skin margins were undermined to an appropriate distance in all directions around the primary defect and laterally outward around the island pedicle utilizing iris scissors.  There was minimal undermining beneath the pedicle flap.
Island Pedicle Flap-Requiring Vessel Identification Text: The defect edges were debeveled with a #15 scalpel blade.  Given the location of the defect, shape of the defect and the proximity to free margins an island pedicle advancement flap was deemed most appropriate.  Using a sterile surgical marker, an appropriate advancement flap was drawn, based on the axial vessel mentioned above, incorporating the defect, outlining the appropriate donor tissue and placing the expected incisions within the relaxed skin tension lines where possible.    The area thus outlined was incised deep to adipose tissue with a #15 scalpel blade.  The skin margins were undermined to an appropriate distance in all directions around the primary defect and laterally outward around the island pedicle utilizing iris scissors.  There was minimal undermining beneath the pedicle flap.
Keystone Flap Text: The defect edges were debeveled with a #15 scalpel blade.  Given the location of the defect, shape of the defect a keystone flap was deemed most appropriate.  Using a sterile surgical marker, an appropriate keystone flap was drawn incorporating the defect, outlining the appropriate donor tissue and placing the expected incisions within the relaxed skin tension lines where possible. The area thus outlined was incised deep to adipose tissue with a #15 scalpel blade.  The skin margins were undermined to an appropriate distance in all directions around the primary defect and laterally outward around the flap utilizing iris scissors.
O-T Plasty Text: The defect edges were debeveled with a #15 scalpel blade.  Given the location of the defect, shape of the defect and the proximity to free margins an O-T plasty was deemed most appropriate.  Using a sterile surgical marker, an appropriate O-T plasty was drawn incorporating the defect and placing the expected incisions within the relaxed skin tension lines where possible.    The area thus outlined was incised deep to adipose tissue with a #15 scalpel blade.  The skin margins were undermined to an appropriate distance in all directions utilizing iris scissors.
O-Z Plasty Text: The defect edges were debeveled with a #15 scalpel blade.  Given the location of the defect, shape of the defect and the proximity to free margins an O-Z plasty (double transposition flap) was deemed most appropriate.  Using a sterile surgical marker, the appropriate transposition flaps were drawn incorporating the defect and placing the expected incisions within the relaxed skin tension lines where possible.    The area thus outlined was incised deep to adipose tissue with a #15 scalpel blade.  The skin margins were undermined to an appropriate distance in all directions utilizing iris scissors.  Hemostasis was achieved with electrocautery.  The flaps were then transposed into place, one clockwise and the other counterclockwise, and anchored with interrupted buried subcutaneous sutures.
Double O-Z Plasty Text: The defect edges were debeveled with a #15 scalpel blade.  Given the location of the defect, shape of the defect and the proximity to free margins a Double O-Z plasty (double transposition flap) was deemed most appropriate.  Using a sterile surgical marker, the appropriate transposition flaps were drawn incorporating the defect and placing the expected incisions within the relaxed skin tension lines where possible. The area thus outlined was incised deep to adipose tissue with a #15 scalpel blade.  The skin margins were undermined to an appropriate distance in all directions utilizing iris scissors.  Hemostasis was achieved with electrocautery.  The flaps were then transposed into place, one clockwise and the other counterclockwise, and anchored with interrupted buried subcutaneous sutures.
V-Y Plasty Text: The defect edges were debeveled with a #15 scalpel blade.  Given the location of the defect, shape of the defect and the proximity to free margins an V-Y advancement flap was deemed most appropriate.  Using a sterile surgical marker, an appropriate advancement flap was drawn incorporating the defect and placing the expected incisions within the relaxed skin tension lines where possible.    The area thus outlined was incised deep to adipose tissue with a #15 scalpel blade.  The skin margins were undermined to an appropriate distance in all directions utilizing iris scissors.
H Plasty Text: Given the location of the defect, shape of the defect and the proximity to free margins a H-plasty was deemed most appropriate for repair.  Using a sterile surgical marker, the appropriate advancement arms of the H-plasty were drawn incorporating the defect and placing the expected incisions within the relaxed skin tension lines where possible. The area thus outlined was incised deep to adipose tissue with a #15 scalpel blade. The skin margins were undermined to an appropriate distance in all directions utilizing iris scissors.  The opposing advancement arms were then advanced into place in opposite direction and anchored with interrupted buried subcutaneous sutures.
W Plasty Text: The lesion was extirpated to the level of the fat with a #15 scalpel blade.  Given the location of the defect, shape of the defect and the proximity to free margins a W-plasty was deemed most appropriate for repair.  Using a sterile surgical marker, the appropriate transposition arms of the W-plasty were drawn incorporating the defect and placing the expected incisions within the relaxed skin tension lines where possible.    The area thus outlined was incised deep to adipose tissue with a #15 scalpel blade.  The skin margins were undermined to an appropriate distance in all directions utilizing iris scissors.  The opposing transposition arms were then transposed into place in opposite direction and anchored with interrupted buried subcutaneous sutures.
Z Plasty Text: The lesion was extirpated to the level of the fat with a #15 scalpel blade.  Given the location of the defect, shape of the defect and the proximity to free margins a Z-plasty was deemed most appropriate for repair.  Using a sterile surgical marker, the appropriate transposition arms of the Z-plasty were drawn incorporating the defect and placing the expected incisions within the relaxed skin tension lines where possible.    The area thus outlined was incised deep to adipose tissue with a #15 scalpel blade.  The skin margins were undermined to an appropriate distance in all directions utilizing iris scissors.  The opposing transposition arms were then transposed into place in opposite direction and anchored with interrupted buried subcutaneous sutures.
Double Z Plasty Text: The lesion was extirpated to the level of the fat with a #15 scalpel blade. Given the location of the defect, shape of the defect and the proximity to free margins a double Z-plasty was deemed most appropriate for repair. Using a sterile surgical marker, the appropriate transposition arms of the double Z-plasty were drawn incorporating the defect and placing the expected incisions within the relaxed skin tension lines where possible. The area thus outlined was incised deep to adipose tissue with a #15 scalpel blade. The skin margins were undermined to an appropriate distance in all directions utilizing iris scissors. The opposing transposition arms were then transposed and carried over into place in opposite direction and anchored with interrupted buried subcutaneous sutures.
Zygomaticofacial Flap Text: Given the location of the defect, shape of the defect and the proximity to free margins a zygomaticofacial flap was deemed most appropriate for repair.  Using a sterile surgical marker, the appropriate flap was drawn incorporating the defect and placing the expected incisions within the relaxed skin tension lines where possible. The area thus outlined was incised deep to adipose tissue with a #15 scalpel blade with preservation of a vascular pedicle.  The skin margins were undermined to an appropriate distance in all directions utilizing iris scissors.  The flap was then placed into the defect and anchored with interrupted buried subcutaneous sutures.
Cheek Interpolation Flap Text: A decision was made to reconstruct the defect utilizing an interpolation axial flap and a staged reconstruction.  A telfa template was made of the defect.  This telfa template was then used to outline the Cheek Interpolation flap.  The donor area for the pedicle flap was then injected with anesthesia.  The flap was excised through the skin and subcutaneous tissue down to the layer of the underlying musculature.  The interpolation flap was carefully excised within this deep plane to maintain its blood supply.  The edges of the donor site were undermined.   The donor site was closed in a primary fashion.  The pedicle was then rotated into position and sutured.  Once the tube was sutured into place, adequate blood supply was confirmed with blanching and refill.  The pedicle was then wrapped with xeroform gauze and dressed appropriately with a telfa and gauze bandage to ensure continued blood supply and protect the attached pedicle.
Cheek-To-Nose Interpolation Flap Text: A decision was made to reconstruct the defect utilizing an interpolation axial flap and a staged reconstruction.  A telfa template was made of the defect.  This telfa template was then used to outline the Cheek-To-Nose Interpolation flap.  The donor area for the pedicle flap was then injected with anesthesia.  The flap was excised through the skin and subcutaneous tissue down to the layer of the underlying musculature.  The interpolation flap was carefully excised within this deep plane to maintain its blood supply.  The edges of the donor site were undermined.   The donor site was closed in a primary fashion.  The pedicle was then rotated into position and sutured.  Once the tube was sutured into place, adequate blood supply was confirmed with blanching and refill.  The pedicle was then wrapped with xeroform gauze and dressed appropriately with a telfa and gauze bandage to ensure continued blood supply and protect the attached pedicle.
Interpolation Flap Text: A decision was made to reconstruct the defect utilizing an interpolation axial flap and a staged reconstruction.  A telfa template was made of the defect.  This telfa template was then used to outline the interpolation flap.  The donor area for the pedicle flap was then injected with anesthesia.  The flap was excised through the skin and subcutaneous tissue down to the layer of the underlying musculature.  The interpolation flap was carefully excised within this deep plane to maintain its blood supply.  The edges of the donor site were undermined.   The donor site was closed in a primary fashion.  The pedicle was then rotated into position and sutured.  Once the tube was sutured into place, adequate blood supply was confirmed with blanching and refill.  The pedicle was then wrapped with xeroform gauze and dressed appropriately with a telfa and gauze bandage to ensure continued blood supply and protect the attached pedicle.
Melolabial Interpolation Flap Text: A decision was made to reconstruct the defect utilizing an interpolation axial flap and a staged reconstruction.  A telfa template was made of the defect.  This telfa template was then used to outline the melolabial interpolation flap.  The donor area for the pedicle flap was then injected with anesthesia.  The flap was excised through the skin and subcutaneous tissue down to the layer of the underlying musculature.  The pedicle flap was carefully excised within this deep plane to maintain its blood supply.  The edges of the donor site were undermined.   The donor site was closed in a primary fashion.  The pedicle was then rotated into position and sutured.  Once the tube was sutured into place, adequate blood supply was confirmed with blanching and refill.  The pedicle was then wrapped with xeroform gauze and dressed appropriately with a telfa and gauze bandage to ensure continued blood supply and protect the attached pedicle.
Mastoid Interpolation Flap Text: A decision was made to reconstruct the defect utilizing an interpolation axial flap and a staged reconstruction.  A telfa template was made of the defect.  This telfa template was then used to outline the mastoid interpolation flap.  The donor area for the pedicle flap was then injected with anesthesia.  The flap was excised through the skin and subcutaneous tissue down to the layer of the underlying musculature.  The pedicle flap was carefully excised within this deep plane to maintain its blood supply.  The edges of the donor site were undermined.   The donor site was closed in a primary fashion.  The pedicle was then rotated into position and sutured.  Once the tube was sutured into place, adequate blood supply was confirmed with blanching and refill.  The pedicle was then wrapped with xeroform gauze and dressed appropriately with a telfa and gauze bandage to ensure continued blood supply and protect the attached pedicle.
Posterior Auricular Interpolation Flap Text: A decision was made to reconstruct the defect utilizing an interpolation axial flap and a staged reconstruction.  A telfa template was made of the defect.  This telfa template was then used to outline the posterior auricular interpolation flap.  The donor area for the pedicle flap was then injected with anesthesia.  The flap was excised through the skin and subcutaneous tissue down to the layer of the underlying musculature.  The pedicle flap was carefully excised within this deep plane to maintain its blood supply.  The edges of the donor site were undermined.   The donor site was closed in a primary fashion.  The pedicle was then rotated into position and sutured.  Once the tube was sutured into place, adequate blood supply was confirmed with blanching and refill.  The pedicle was then wrapped with xeroform gauze and dressed appropriately with a telfa and gauze bandage to ensure continued blood supply and protect the attached pedicle.
Paramedian Forehead Flap Text: A decision was made to reconstruct the defect utilizing an interpolation axial flap and a staged reconstruction.  A telfa template was made of the defect.  This telfa template was then used to outline the paramedian forehead pedicle flap.  The donor area for the pedicle flap was then injected with anesthesia.  The flap was excised through the skin and subcutaneous tissue down to the layer of the underlying musculature.  The pedicle flap was carefully excised within this deep plane to maintain its blood supply.  The edges of the donor site were undermined.   The donor site was closed in a primary fashion.  The pedicle was then rotated into position and sutured.  Once the tube was sutured into place, adequate blood supply was confirmed with blanching and refill.  The pedicle was then wrapped with xeroform gauze and dressed appropriately with a telfa and gauze bandage to ensure continued blood supply and protect the attached pedicle.
Abbe Flap (Upper To Lower Lip) Text: The defect of the lower lip was assessed and measured.  Given the location and size of the defect, an Abbe flap was deemed most appropriate. Using a sterile surgical marker, an appropriate Abbe flap was measured and drawn on the upper lip. Local anesthesia was then infiltrated.  A scalpel was then used to incise the upper lip through and through the skin, vermilion, muscle and mucosa, leaving the flap pedicled on the opposite side.  The flap was then rotated and transferred to the lower lip defect.  The flap was then sutured into place with a three layer technique, closing the orbicularis oris muscle layer with subcutaneous buried sutures, followed by a mucosal layer and an epidermal layer.
Abbe Flap (Lower To Upper Lip) Text: The defect of the upper lip was assessed and measured.  Given the location and size of the defect, an Abbe flap was deemed most appropriate. Using a sterile surgical marker, an appropriate Abbe flap was measured and drawn on the lower lip. Local anesthesia was then infiltrated. A scalpel was then used to incise the upper lip through and through the skin, vermilion, muscle and mucosa, leaving the flap pedicled on the opposite side.  The flap was then rotated and transferred to the lower lip defect.  The flap was then sutured into place with a three layer technique, closing the orbicularis oris muscle layer with subcutaneous buried sutures, followed by a mucosal layer and an epidermal layer.
Estlander Flap (Upper To Lower Lip) Text: The defect of the lower lip was assessed and measured.  Given the location and size of the defect, an Estlander flap was deemed most appropriate. Using a sterile surgical marker, an appropriate Estlander flap was measured and drawn on the upper lip. Local anesthesia was then infiltrated. A scalpel was then used to incise the lateral aspect of the flap, through skin, muscle and mucosa, leaving the flap pedicled medially.  The flap was then rotated and positioned to fill the lower lip defect.  The flap was then sutured into place with a three layer technique, closing the orbicularis oris muscle layer with subcutaneous buried sutures, followed by a mucosal layer and an epidermal layer.
Lip Wedge Excision Repair Text: Given the location of the defect and the proximity to free margins a full thickness wedge repair was deemed most appropriate.  Using a sterile surgical marker, the appropriate repair was drawn incorporating the defect and placing the expected incisions perpendicular to the vermilion border.  The vermilion border was also meticulously outlined to ensure appropriate reapproximation during the repair.  The area thus outlined was incised through and through with a #15 scalpel blade.  The muscularis and dermis were reaproximated with deep sutures following hemostasis. Care was taken to realign the vermilion border before proceeding with the superficial closure.  Once the vermilion was realigned the superfical and mucosal closure was finished.
Ftsg Text: The defect edges were debeveled with a #15 scalpel blade.  Given the location of the defect, shape of the defect and the proximity to free margins a full thickness skin graft was deemed most appropriate.  Using a sterile surgical marker, the primary defect shape was transferred to the donor site. The area thus outlined was incised deep to adipose tissue with a #15 scalpel blade.  The harvested graft was then trimmed of adipose tissue until only dermis and epidermis was left.  The skin margins of the secondary defect were undermined to an appropriate distance in all directions utilizing iris scissors.  The secondary defect was closed with interrupted buried subcutaneous sutures.  The skin edges were then re-apposed with running  sutures.  The skin graft was then placed in the primary defect and oriented appropriately.
Split-Thickness Skin Graft Text: The defect edges were debeveled with a #15 scalpel blade.  Given the location of the defect, shape of the defect and the proximity to free margins a split thickness skin graft was deemed most appropriate.  Using a sterile surgical marker, the primary defect shape was transferred to the donor site. The split thickness graft was then harvested.  The skin graft was then placed in the primary defect and oriented appropriately.
Pinch Graft Text: The defect edges were debeveled with a #15 scalpel blade. Given the location of the defect, shape of the defect and the proximity to free margins a pinch graft was deemed most appropriate. Using a sterile surgical marker, the primary defect shape was transferred to the donor site. The area thus outlined was incised deep to adipose tissue with a #15 scalpel blade.  The harvested graft was then trimmed of adipose tissue until only dermis and epidermis was left. The skin margins of the secondary defect were undermined to an appropriate distance in all directions utilizing iris scissors.  The secondary defect was closed with interrupted buried subcutaneous sutures.  The skin edges were then re-apposed with running  sutures.  The skin graft was then placed in the primary defect and oriented appropriately.
Burow's Graft Text: The defect edges were debeveled with a #15 scalpel blade.  Given the location of the defect, shape of the defect, the proximity to free margins and the presence of a standing cone deformity a Burow's skin graft was deemed most appropriate. The standing cone was removed and this tissue was then trimmed to the shape of the primary defect. The adipose tissue was also removed until only dermis and epidermis were left.  The skin margins of the secondary defect were undermined to an appropriate distance in all directions utilizing iris scissors.  The secondary defect was closed with interrupted buried subcutaneous sutures.  The skin edges were then re-apposed with running  sutures.  The skin graft was then placed in the primary defect and oriented appropriately.
Cartilage Graft Text: The defect edges were debeveled with a #15 scalpel blade.  Given the location of the defect, shape of the defect, the fact the defect involved a full thickness cartilage defect a cartilage graft was deemed most appropriate.  An appropriate donor site was identified, cleansed, and anesthetized. The cartilage graft was then harvested and transferred to the recipient site, oriented appropriately and then sutured into place.  The secondary defect was then repaired using a primary closure.
Composite Graft Text: The defect edges were debeveled with a #15 scalpel blade.  Given the location of the defect, shape of the defect, the proximity to free margins and the fact the defect was full thickness a composite graft was deemed most appropriate.  The defect was outline and then transferred to the donor site.  A full thickness graft was then excised from the donor site. The graft was then placed in the primary defect, oriented appropriately and then sutured into place.  The secondary defect was then repaired using a primary closure.
Epidermal Autograft Text: The defect edges were debeveled with a #15 scalpel blade.  Given the location of the defect, shape of the defect and the proximity to free margins an epidermal autograft was deemed most appropriate.  Using a sterile surgical marker, the primary defect shape was transferred to the donor site. The epidermal graft was then harvested.  The skin graft was then placed in the primary defect and oriented appropriately.
Dermal Autograft Text: The defect edges were debeveled with a #15 scalpel blade.  Given the location of the defect, shape of the defect and the proximity to free margins a dermal autograft was deemed most appropriate.  Using a sterile surgical marker, the primary defect shape was transferred to the donor site. The area thus outlined was incised deep to adipose tissue with a #15 scalpel blade.  The harvested graft was then trimmed of adipose and epidermal tissue until only dermis was left.  The skin graft was then placed in the primary defect and oriented appropriately.
Skin Substitute Text: The defect edges were debeveled with a #15 scalpel blade.  Given the location of the defect, shape of the defect and the proximity to free margins a skin substitute graft was deemed most appropriate.  The graft material was trimmed to fit the size of the defect. The graft was then placed in the primary defect and oriented appropriately.
Tissue Cultured Epidermal Autograft Text: The defect edges were debeveled with a #15 scalpel blade.  Given the location of the defect, shape of the defect and the proximity to free margins a tissue cultured epidermal autograft was deemed most appropriate.  The graft was then trimmed to fit the size of the defect.  The graft was then placed in the primary defect and oriented appropriately.
Xenograft Text: The defect edges were debeveled with a #15 scalpel blade.  Given the location of the defect, shape of the defect and the proximity to free margins a xenograft was deemed most appropriate.  The graft was then trimmed to fit the size of the defect.  The graft was then placed in the primary defect and oriented appropriately.
Purse String (Intermediate) Text: Given the location of the defect and the characteristics of the surrounding skin a purse string intermediate closure was deemed most appropriate.  Undermining was performed circumferentially around the surgical defect.  A purse string suture was then placed and tightened.
Purse String (Simple) Text: Given the location of the defect and the characteristics of the surrounding skin a purse string simple closure was deemed most appropriate.  Undermining was performed circumferentially around the surgical defect.  A purse string suture was then placed and tightened.
Complex Repair And Single Advancement Flap Text: The defect edges were debeveled with a #15 scalpel blade.  The primary defect was closed partially with a complex linear closure.  Given the location of the remaining defect, shape of the defect and the proximity to free margins a single advancement flap was deemed most appropriate for complete closure of the defect.  Using a sterile surgical marker, an appropriate advancement flap was drawn incorporating the defect and placing the expected incisions within the relaxed skin tension lines where possible.    The area thus outlined was incised deep to adipose tissue with a #15 scalpel blade.  The skin margins were undermined to an appropriate distance in all directions utilizing iris scissors.
Complex Repair And Double Advancement Flap Text: The defect edges were debeveled with a #15 scalpel blade.  The primary defect was closed partially with a complex linear closure.  Given the location of the remaining defect, shape of the defect and the proximity to free margins a double advancement flap was deemed most appropriate for complete closure of the defect.  Using a sterile surgical marker, an appropriate advancement flap was drawn incorporating the defect and placing the expected incisions within the relaxed skin tension lines where possible.    The area thus outlined was incised deep to adipose tissue with a #15 scalpel blade.  The skin margins were undermined to an appropriate distance in all directions utilizing iris scissors.
Complex Repair And Modified Advancement Flap Text: The defect edges were debeveled with a #15 scalpel blade.  The primary defect was closed partially with a complex linear closure.  Given the location of the remaining defect, shape of the defect and the proximity to free margins a modified advancement flap was deemed most appropriate for complete closure of the defect.  Using a sterile surgical marker, an appropriate advancement flap was drawn incorporating the defect and placing the expected incisions within the relaxed skin tension lines where possible.    The area thus outlined was incised deep to adipose tissue with a #15 scalpel blade.  The skin margins were undermined to an appropriate distance in all directions utilizing iris scissors.
Complex Repair And A-T Advancement Flap Text: The defect edges were debeveled with a #15 scalpel blade.  The primary defect was closed partially with a complex linear closure.  Given the location of the remaining defect, shape of the defect and the proximity to free margins an A-T advancement flap was deemed most appropriate for complete closure of the defect.  Using a sterile surgical marker, an appropriate advancement flap was drawn incorporating the defect and placing the expected incisions within the relaxed skin tension lines where possible.    The area thus outlined was incised deep to adipose tissue with a #15 scalpel blade.  The skin margins were undermined to an appropriate distance in all directions utilizing iris scissors.
Complex Repair And O-T Advancement Flap Text: The defect edges were debeveled with a #15 scalpel blade.  The primary defect was closed partially with a complex linear closure.  Given the location of the remaining defect, shape of the defect and the proximity to free margins an O-T advancement flap was deemed most appropriate for complete closure of the defect.  Using a sterile surgical marker, an appropriate advancement flap was drawn incorporating the defect and placing the expected incisions within the relaxed skin tension lines where possible.    The area thus outlined was incised deep to adipose tissue with a #15 scalpel blade.  The skin margins were undermined to an appropriate distance in all directions utilizing iris scissors.
Complex Repair And O-L Flap Text: The defect edges were debeveled with a #15 scalpel blade.  The primary defect was closed partially with a complex linear closure.  Given the location of the remaining defect, shape of the defect and the proximity to free margins an O-L flap was deemed most appropriate for complete closure of the defect.  Using a sterile surgical marker, an appropriate flap was drawn incorporating the defect and placing the expected incisions within the relaxed skin tension lines where possible.    The area thus outlined was incised deep to adipose tissue with a #15 scalpel blade.  The skin margins were undermined to an appropriate distance in all directions utilizing iris scissors.
Complex Repair And Bilobe Flap Text: The defect edges were debeveled with a #15 scalpel blade.  The primary defect was closed partially with a complex linear closure.  Given the location of the remaining defect, shape of the defect and the proximity to free margins a bilobe flap was deemed most appropriate for complete closure of the defect.  Using a sterile surgical marker, an appropriate advancement flap was drawn incorporating the defect and placing the expected incisions within the relaxed skin tension lines where possible.    The area thus outlined was incised deep to adipose tissue with a #15 scalpel blade.  The skin margins were undermined to an appropriate distance in all directions utilizing iris scissors.
Complex Repair And Melolabial Flap Text: The defect edges were debeveled with a #15 scalpel blade.  The primary defect was closed partially with a complex linear closure.  Given the location of the remaining defect, shape of the defect and the proximity to free margins a melolabial flap was deemed most appropriate for complete closure of the defect.  Using a sterile surgical marker, an appropriate advancement flap was drawn incorporating the defect and placing the expected incisions within the relaxed skin tension lines where possible.    The area thus outlined was incised deep to adipose tissue with a #15 scalpel blade.  The skin margins were undermined to an appropriate distance in all directions utilizing iris scissors.
Complex Repair And Rotation Flap Text: The defect edges were debeveled with a #15 scalpel blade.  The primary defect was closed partially with a complex linear closure.  Given the location of the remaining defect, shape of the defect and the proximity to free margins a rotation flap was deemed most appropriate for complete closure of the defect.  Using a sterile surgical marker, an appropriate advancement flap was drawn incorporating the defect and placing the expected incisions within the relaxed skin tension lines where possible.    The area thus outlined was incised deep to adipose tissue with a #15 scalpel blade.  The skin margins were undermined to an appropriate distance in all directions utilizing iris scissors.
Complex Repair And Rhombic Flap Text: The defect edges were debeveled with a #15 scalpel blade.  The primary defect was closed partially with a complex linear closure.  Given the location of the remaining defect, shape of the defect and the proximity to free margins a rhombic flap was deemed most appropriate for complete closure of the defect.  Using a sterile surgical marker, an appropriate advancement flap was drawn incorporating the defect and placing the expected incisions within the relaxed skin tension lines where possible.    The area thus outlined was incised deep to adipose tissue with a #15 scalpel blade.  The skin margins were undermined to an appropriate distance in all directions utilizing iris scissors.
Complex Repair And Transposition Flap Text: The defect edges were debeveled with a #15 scalpel blade.  The primary defect was closed partially with a complex linear closure.  Given the location of the remaining defect, shape of the defect and the proximity to free margins a transposition flap was deemed most appropriate for complete closure of the defect.  Using a sterile surgical marker, an appropriate advancement flap was drawn incorporating the defect and placing the expected incisions within the relaxed skin tension lines where possible.    The area thus outlined was incised deep to adipose tissue with a #15 scalpel blade.  The skin margins were undermined to an appropriate distance in all directions utilizing iris scissors.
Complex Repair And V-Y Plasty Text: The defect edges were debeveled with a #15 scalpel blade.  The primary defect was closed partially with a complex linear closure.  Given the location of the remaining defect, shape of the defect and the proximity to free margins a V-Y plasty was deemed most appropriate for complete closure of the defect.  Using a sterile surgical marker, an appropriate advancement flap was drawn incorporating the defect and placing the expected incisions within the relaxed skin tension lines where possible.    The area thus outlined was incised deep to adipose tissue with a #15 scalpel blade.  The skin margins were undermined to an appropriate distance in all directions utilizing iris scissors.
Complex Repair And M Plasty Text: The defect edges were debeveled with a #15 scalpel blade.  The primary defect was closed partially with a complex linear closure.  Given the location of the remaining defect, shape of the defect and the proximity to free margins an M plasty was deemed most appropriate for complete closure of the defect.  Using a sterile surgical marker, an appropriate advancement flap was drawn incorporating the defect and placing the expected incisions within the relaxed skin tension lines where possible.    The area thus outlined was incised deep to adipose tissue with a #15 scalpel blade.  The skin margins were undermined to an appropriate distance in all directions utilizing iris scissors.
Complex Repair And Double M Plasty Text: The defect edges were debeveled with a #15 scalpel blade.  The primary defect was closed partially with a complex linear closure.  Given the location of the remaining defect, shape of the defect and the proximity to free margins a double M plasty was deemed most appropriate for complete closure of the defect.  Using a sterile surgical marker, an appropriate advancement flap was drawn incorporating the defect and placing the expected incisions within the relaxed skin tension lines where possible.    The area thus outlined was incised deep to adipose tissue with a #15 scalpel blade.  The skin margins were undermined to an appropriate distance in all directions utilizing iris scissors.
Complex Repair And W Plasty Text: The defect edges were debeveled with a #15 scalpel blade.  The primary defect was closed partially with a complex linear closure.  Given the location of the remaining defect, shape of the defect and the proximity to free margins a W plasty was deemed most appropriate for complete closure of the defect.  Using a sterile surgical marker, an appropriate advancement flap was drawn incorporating the defect and placing the expected incisions within the relaxed skin tension lines where possible.    The area thus outlined was incised deep to adipose tissue with a #15 scalpel blade.  The skin margins were undermined to an appropriate distance in all directions utilizing iris scissors.
Complex Repair And Z Plasty Text: The defect edges were debeveled with a #15 scalpel blade.  The primary defect was closed partially with a complex linear closure.  Given the location of the remaining defect, shape of the defect and the proximity to free margins a Z plasty was deemed most appropriate for complete closure of the defect.  Using a sterile surgical marker, an appropriate advancement flap was drawn incorporating the defect and placing the expected incisions within the relaxed skin tension lines where possible.    The area thus outlined was incised deep to adipose tissue with a #15 scalpel blade.  The skin margins were undermined to an appropriate distance in all directions utilizing iris scissors.
Complex Repair And Dorsal Nasal Flap Text: The defect edges were debeveled with a #15 scalpel blade.  The primary defect was closed partially with a complex linear closure.  Given the location of the remaining defect, shape of the defect and the proximity to free margins a dorsal nasal flap was deemed most appropriate for complete closure of the defect.  Using a sterile surgical marker, an appropriate flap was drawn incorporating the defect and placing the expected incisions within the relaxed skin tension lines where possible.    The area thus outlined was incised deep to adipose tissue with a #15 scalpel blade.  The skin margins were undermined to an appropriate distance in all directions utilizing iris scissors.
Complex Repair And Ftsg Text: The defect edges were debeveled with a #15 scalpel blade.  The primary defect was closed partially with a complex linear closure.  Given the location of the defect, shape of the defect and the proximity to free margins a full thickness skin graft was deemed most appropriate to repair the remaining defect.  The graft was trimmed to fit the size of the remaining defect.  The graft was then placed in the primary defect, oriented appropriately, and sutured into place.
Complex Repair And Burow's Graft Text: The defect edges were debeveled with a #15 scalpel blade.  The primary defect was closed partially with a complex linear closure.  Given the location of the defect, shape of the defect, the proximity to free margins and the presence of a standing cone deformity a Burow's graft was deemed most appropriate to repair the remaining defect.  The graft was trimmed to fit the size of the remaining defect.  The graft was then placed in the primary defect, oriented appropriately, and sutured into place.
Complex Repair And Split-Thickness Skin Graft Text: The defect edges were debeveled with a #15 scalpel blade.  The primary defect was closed partially with a complex linear closure.  Given the location of the defect, shape of the defect and the proximity to free margins a split thickness skin graft was deemed most appropriate to repair the remaining defect.  The graft was trimmed to fit the size of the remaining defect.  The graft was then placed in the primary defect, oriented appropriately, and sutured into place.
Complex Repair And Epidermal Autograft Text: The defect edges were debeveled with a #15 scalpel blade.  The primary defect was closed partially with a complex linear closure.  Given the location of the defect, shape of the defect and the proximity to free margins an epidermal autograft was deemed most appropriate to repair the remaining defect.  The graft was trimmed to fit the size of the remaining defect.  The graft was then placed in the primary defect, oriented appropriately, and sutured into place.
Complex Repair And Dermal Autograft Text: The defect edges were debeveled with a #15 scalpel blade.  The primary defect was closed partially with a complex linear closure.  Given the location of the defect, shape of the defect and the proximity to free margins an dermal autograft was deemed most appropriate to repair the remaining defect.  The graft was trimmed to fit the size of the remaining defect.  The graft was then placed in the primary defect, oriented appropriately, and sutured into place.
Complex Repair And Tissue Cultured Epidermal Autograft Text: The defect edges were debeveled with a #15 scalpel blade.  The primary defect was closed partially with a complex linear closure.  Given the location of the defect, shape of the defect and the proximity to free margins an tissue cultured epidermal autograft was deemed most appropriate to repair the remaining defect.  The graft was trimmed to fit the size of the remaining defect.  The graft was then placed in the primary defect, oriented appropriately, and sutured into place.
Complex Repair And Xenograft Text: The defect edges were debeveled with a #15 scalpel blade.  The primary defect was closed partially with a complex linear closure.  Given the location of the defect, shape of the defect and the proximity to free margins a xenograft was deemed most appropriate to repair the remaining defect.  The graft was trimmed to fit the size of the remaining defect.  The graft was then placed in the primary defect, oriented appropriately, and sutured into place.
Complex Repair And Skin Substitute Graft Text: The defect edges were debeveled with a #15 scalpel blade.  The primary defect was closed partially with a complex linear closure.  Given the location of the remaining defect, shape of the defect and the proximity to free margins a skin substitute graft was deemed most appropriate to repair the remaining defect.  The graft was trimmed to fit the size of the remaining defect.  The graft was then placed in the primary defect, oriented appropriately, and sutured into place.
Path Notes (To The Dermatopathologist): Please check margins.
Consent was obtained from the patient. The risks and benefits to therapy were discussed in detail. Specifically, the risks of infection, scarring, bleeding, prolonged wound healing, incomplete removal, allergy to anesthesia, nerve injury and recurrence were addressed. Prior to the procedure, the treatment site was clearly identified and confirmed by the patient. All components of Universal Protocol/PAUSE Rule completed.
Post-Care Instructions: I reviewed with the patient in detail post-care instructions. Patient is not to engage in any heavy lifting, exercise, or swimming for the next 14 days. Should the patient develop any fevers, chills, bleeding, severe pain patient will contact the office immediately.
Home Suture Removal Text: Patient was provided a home suture removal kit and will remove their sutures at home.  If they have any questions or difficulties they will call the office.
Where Do You Want The Question To Include Opioid Counseling Located?: Case Summary Tab
Billing Type: Third-Party Bill
Information: Selecting Yes will display possible errors in your note based on the variables you have selected. This validation is only offered as a suggestion for you. PLEASE NOTE THAT THE VALIDATION TEXT WILL BE REMOVED WHEN YOU FINALIZE YOUR NOTE. IF YOU WANT TO FAX A PRELIMINARY NOTE YOU WILL NEED TO TOGGLE THIS TO 'NO' IF YOU DO NOT WANT IT IN YOUR FAXED NOTE.
Nasolabial Transposition Flap Text: The defect edges were debeveled with a #15 scalpel blade.  Given the size, depth and location of the defect and the proximity to free margins a nasolabial transposition flap was deemed most appropriate. Using a sterile surgical marker, an appropriate flap was drawn incorporating the defect. The area thus outlined was incised with a #15 scalpel blade. The skin margins were undermined to an appropriate distance in all directions utilizing iris scissors. Following this, the designed flap was carried into the primary defect and sutured into place.

## 2024-06-03 ENCOUNTER — HOSPITAL ENCOUNTER (OUTPATIENT)
Dept: RADIOLOGY | Facility: MEDICAL CENTER | Age: 39
End: 2024-06-03
Attending: STUDENT IN AN ORGANIZED HEALTH CARE EDUCATION/TRAINING PROGRAM
Payer: COMMERCIAL

## 2024-06-03 ENCOUNTER — HOSPITAL ENCOUNTER (OUTPATIENT)
Dept: LAB | Facility: MEDICAL CENTER | Age: 39
End: 2024-06-03
Attending: STUDENT IN AN ORGANIZED HEALTH CARE EDUCATION/TRAINING PROGRAM
Payer: COMMERCIAL

## 2024-06-03 DIAGNOSIS — R76.8 SEROLOGIC AUTOIMMUNITY: ICD-10-CM

## 2024-06-03 DIAGNOSIS — M25.50 POLYARTHRALGIA: ICD-10-CM

## 2024-06-03 PROCEDURE — 36415 COLL VENOUS BLD VENIPUNCTURE: CPT

## 2024-06-03 PROCEDURE — 86431 RHEUMATOID FACTOR QUANT: CPT

## 2024-06-03 PROCEDURE — 86039 ANTINUCLEAR ANTIBODIES (ANA): CPT

## 2024-06-03 PROCEDURE — 83516 IMMUNOASSAY NONANTIBODY: CPT

## 2024-06-03 PROCEDURE — 77077 JOINT SURVEY SINGLE VIEW: CPT

## 2024-06-03 PROCEDURE — 86200 CCP ANTIBODY: CPT

## 2024-06-06 LAB
ANA PAT SER IF-IMP: NORMAL
CARBAMYLATED PROTEIN ANTIBODY, IGG Q6043: 4 UNITS (ref 0–19)
CCP IGA+IGG SERPL IA-ACNC: 3 UNITS (ref 0–19)
NUCLEAR IGG SER QL IF: NORMAL
RHEUMATOID FACT SER NEPH-ACNC: 23 IU/ML (ref 0–14)

## 2024-06-11 ENCOUNTER — APPOINTMENT (RX ONLY)
Dept: URBAN - METROPOLITAN AREA CLINIC 20 | Facility: CLINIC | Age: 39
Setting detail: DERMATOLOGY
End: 2024-06-11

## 2024-06-11 DIAGNOSIS — Z48.02 ENCOUNTER FOR REMOVAL OF SUTURES: ICD-10-CM

## 2024-06-11 PROCEDURE — ? SUTURE REMOVAL (GLOBAL PERIOD)

## 2024-06-11 ASSESSMENT — LOCATION DETAILED DESCRIPTION DERM: LOCATION DETAILED: RIGHT ANTERIOR PROXIMAL THIGH

## 2024-06-11 ASSESSMENT — LOCATION ZONE DERM: LOCATION ZONE: LEG

## 2024-06-11 ASSESSMENT — LOCATION SIMPLE DESCRIPTION DERM: LOCATION SIMPLE: RIGHT THIGH

## 2024-06-11 NOTE — PROCEDURE: SUTURE REMOVAL (GLOBAL PERIOD)
Detail Level: Detailed
Add 72142 Cpt? (Important Note: In 2017 The Use Of 44515 Is Being Tracked By Cms To Determine Future Global Period Reimbursement For Global Periods): no

## 2024-06-12 ENCOUNTER — TELEPHONE (OUTPATIENT)
Dept: HEALTH INFORMATION MANAGEMENT | Facility: OTHER | Age: 39
End: 2024-06-12
Payer: COMMERCIAL

## 2024-08-19 ENCOUNTER — TELEPHONE (OUTPATIENT)
Dept: CARDIOLOGY | Facility: MEDICAL CENTER | Age: 39
End: 2024-08-19
Payer: COMMERCIAL

## 2024-08-19 NOTE — TELEPHONE ENCOUNTER
Spoke to patient in regards to records for NP appointment with [facility].     Patient has seen a cardiologist before?  Yes   If yes, where?: [facility],  does not recall name of provider    Any recent cardiac testing outside of [facility]?  Yes   What testing: labs    Where was it completed?: [facility]    Were any records requested?  Yes   Fax: [phone] called [facility] for lab results.

## 2024-08-20 ENCOUNTER — OFFICE VISIT (OUTPATIENT)
Dept: CARDIOLOGY | Facility: MEDICAL CENTER | Age: 39
End: 2024-08-20
Attending: INTERNAL MEDICINE
Payer: COMMERCIAL

## 2024-08-20 VITALS
OXYGEN SATURATION: 98 % | WEIGHT: 208 LBS | BODY MASS INDEX: 32.65 KG/M2 | DIASTOLIC BLOOD PRESSURE: 80 MMHG | RESPIRATION RATE: 16 BRPM | HEART RATE: 70 BPM | SYSTOLIC BLOOD PRESSURE: 114 MMHG | HEIGHT: 67 IN

## 2024-08-20 DIAGNOSIS — Z01.818 ENCOUNTER FOR PREOPERATIVE ASSESSMENT: ICD-10-CM

## 2024-08-20 DIAGNOSIS — I10 PRIMARY HYPERTENSION: ICD-10-CM

## 2024-08-20 PROBLEM — Z86.718 HISTORY OF DVT (DEEP VEIN THROMBOSIS): Status: ACTIVE | Noted: 2024-08-20

## 2024-08-20 PROBLEM — Z86.711 HISTORY OF PULMONARY EMBOLUS (PE): Status: ACTIVE | Noted: 2024-08-20

## 2024-08-20 LAB — EKG IMPRESSION: NORMAL

## 2024-08-20 PROCEDURE — 3074F SYST BP LT 130 MM HG: CPT | Performed by: INTERNAL MEDICINE

## 2024-08-20 PROCEDURE — 93005 ELECTROCARDIOGRAM TRACING: CPT | Performed by: INTERNAL MEDICINE

## 2024-08-20 PROCEDURE — 93010 ELECTROCARDIOGRAM REPORT: CPT | Performed by: INTERNAL MEDICINE

## 2024-08-20 PROCEDURE — 99204 OFFICE O/P NEW MOD 45 MIN: CPT | Performed by: INTERNAL MEDICINE

## 2024-08-20 PROCEDURE — 99211 OFF/OP EST MAY X REQ PHY/QHP: CPT | Performed by: INTERNAL MEDICINE

## 2024-08-20 PROCEDURE — 3079F DIAST BP 80-89 MM HG: CPT | Performed by: INTERNAL MEDICINE

## 2024-08-20 RX ORDER — CETIRIZINE HYDROCHLORIDE 10 MG/1
10 TABLET ORAL DAILY
COMMUNITY

## 2024-08-20 ASSESSMENT — ENCOUNTER SYMPTOMS
MYALGIAS: 0
ORTHOPNEA: 0
DIZZINESS: 0
PALPITATIONS: 0
PND: 0
COUGH: 0
SHORTNESS OF BREATH: 0
LOSS OF CONSCIOUSNESS: 0

## 2024-08-20 ASSESSMENT — FIBROSIS 4 INDEX: FIB4 SCORE: 1.724137931034482759

## 2024-08-20 NOTE — PROGRESS NOTES
"    Cardiology Initial Consultation Note    Date of note:    8/20/2024    Primary Care Provider: VIMAL Reyes  Referring Provider: Xiomara Shultz,*    Patient Name: Vinita Fields   YOB: 1985  MRN:              [id number]    Chief Complaint   Patient presents with    New Patient     NP Dx:  Long term (current) use of aspirin       History of Present Illness: Ms. Vinita Fields is a 39-year-old woman with past medical history significant for hypertension, history of DVT/PE status post IVC filter presents to the cardiology office for perioperative risk assessment.    She was referred to our office by the gastroenterology team.  She will be undergoing screening colonoscopy in the coming weeks.    Today, she is in good spirits.  She has no major cardiac complaints.  She denies having exertional chest pain or dyspnea.  No lower extremity edema or orthopnea.  She exercises on a daily basis.  Walks approximately 5 miles a day without exertional symptoms.  Does approximately 3 miles at a pace of 4.5 mph on the treadmill without chest pain or dyspnea.  Able to walk up a flight of stairs without cardiac limitations.      Cardiovascular Risk Factors:  1. Smoking status: Denies  2. Type II Diabetes Mellitus: Denies No results found for: \"HBA1C\"  3. Hypertension: Yes  4. Dyslipidemia: Denies No results found for: \"CHOLSTRLTOT\", \"LDL\", \"HDL\", \"TRIGLYCERIDE\"  5. Family history of early Coronary Artery Disease in a first degree relative (Male less than 55 years of age; Female less than 65 years of age): Denies      Review of Systems:  Review of Systems   Constitutional:  Negative for malaise/fatigue.   Respiratory:  Negative for cough and shortness of breath.    Cardiovascular:  Negative for chest pain, palpitations, orthopnea, leg swelling and PND.   Musculoskeletal:  Negative for joint pain and myalgias.   Neurological:  Negative for dizziness and loss of consciousness. "       History reviewed. No pertinent past medical history.      Past Surgical History:   Procedure Laterality Date    PRIMARY C SECTION N/A 2022    Procedure:  SECTION, PRIMARY;  Surgeon: Stephanie Mauricio M.D.;  Location: [facility];  Service: Labor and Delivery         Current Outpatient Medications   Medication Sig Dispense Refill    cetirizine (ZYRTEC) 10 MG Tab Take 10 mg by mouth every day.      lisinopril (PRINIVIL) 10 MG Tab Take 1 Tablet by mouth every day.      aspirin (ASA) 81 MG Chew Tab chewable tablet Chew 81 mg every day.      multivitamin Tab Take 1 Tablet by mouth every day.      valACYclovir (VALTREX) 500 MG Tab        No current facility-administered medications for this visit.         Allergies   Allergen Reactions    Charentais Melon ([place]vian Melon) Hives, Itching, Rash, Shortness of Breath and Swelling         History reviewed. No pertinent family history.      Social History     Socioeconomic History    Marital status:      Spouse name: Not on file    Number of children: Not on file    Years of education: Not on file    Highest education level: Not on file   Occupational History    Not on file   Tobacco Use    Smoking status: Former     Current packs/day: 0.00     Types: Cigarettes     Quit date:      Years since quittin.6    Smokeless tobacco: Never   Substance and Sexual Activity    Alcohol use: Yes     Comment: occ    Drug use: Never    Sexual activity: Yes     Partners: Male   Other Topics Concern    Not on file   Social History Narrative    Not on file     Social Determinants of Health     Financial Resource Strain: Not on file   Food Insecurity: Not on file   Transportation Needs: Not on file   Physical Activity: Not on file   Stress: Not on file (2021)   Social Connections: Not on file   Intimate Partner Violence: Low Risk  (2021)    [facility]    Intimate Partner Violence     Insults You: Not on file  "    Threatens You: Not on file     Screams at You: Not on file     Physically Hurt: Not on file     Intimate Partner Violence Score: Not on file   Housing Stability: Not on file         Physical Exam:  Ambulatory Vitals  /80 (BP Location: Left arm, Patient Position: Sitting, BP Cuff Size: Adult)   Pulse 70   Resp 16   Ht 1.702 m (5' 7\")   Wt 94.3 kg (208 lb)   SpO2 98%    Oxygen Therapy:  Pulse Oximetry: 98 %  BP Readings from Last 4 Encounters:   08/20/24 114/80   05/13/24 128/68   11/28/22 (!) 149/89       Weight/BMI: Body mass index is 32.58 kg/m².  Wt Readings from Last 4 Encounters:   08/20/24 94.3 kg (208 lb)   05/13/24 94.8 kg (209 lb)   11/26/22 114 kg (252 lb)         General: Not in acute distress  HEENT: OP clear   Neck:  No carotid bruits, No JVD appreciated  CVS:  RRR, Normal S1, S2. No murmurs, rubs or gallops  Resp: Normal respiratory effort, lungs CTA bilaterally. No rales or rhonchi  Abdomen: Soft, non-distended, non-tender to palpation  Neurological: Alert and oriented x3, moves all extremities, no focal neurologic deficits  Psychiatric: Appropriate affect  Extremities:   Extremities warm, 2+ bilateral radial pulses.  2+ bilateral dp pulses, no lower extremity edema bilaterally      Lab Data Review:  No results found for: \"CHOLSTRLTOT\", \"LDL\", \"HDL\", \"TRIGLYCERIDE\"    Lab Results   Component Value Date/Time    SODIUM 132 (L) 11/27/2022 10:06 AM    POTASSIUM 4.1 11/27/2022 10:06 AM    CHLORIDE 101 11/27/2022 10:06 AM    CO2 20 11/27/2022 10:06 AM    GLUCOSE 111 (H) 11/27/2022 10:06 AM    BUN 11 11/27/2022 10:06 AM    CREATININE 0.67 11/27/2022 10:06 AM     Lab Results   Component Value Date/Time    ALKPHOSPHAT 64 11/27/2022 10:06 AM    ASTSGOT 25 11/27/2022 10:06 AM    ALTSGPT 16 11/27/2022 10:06 AM    TBILIRUBIN 0.2 11/27/2022 10:06 AM      Lab Results   Component Value Date/Time    WBC 8.3 11/27/2022 10:06 AM    HEMOGLOBIN 12.2 11/27/2022 10:06 AM     No results found for: " "\"HBA1C\"      Cardiac Imaging and Procedures Review:    EKG dated 8/20/2024:   My personal interpretation is sinus rhythm, low voltage precordial leads, no ischemic changes          Assessment & Plan     1. Encounter for preoperative assessment  EKG      2. Primary hypertension              Medical Decision Making:  Ms. Vinita Fields is a 39-year-old woman with past medical history significant for hypertension, history of DVT/PE status post IVC filter presents to the cardiology office for perioperative risk assessment.    1. Encounter for preoperative assessment  -Patient presents to the cardiology office for perioperative risk assessment prior to low risk colonoscopy procedure.  She is currently stable from a cardiac standpoint.  Has good exercise/functional capacity.  Able to achieve > 4 METS without chest pain or dyspnea/cardiac limitations.  -EKG performed in office today shows no evidence of ischemia.  -Patient is at low risk for MACE during proposed low risk procedure.  No additional preoperative cardiac testing needed at this time. Can hold aspirin 3-5 days prior to procedure.    2. Primary hypertension  -Blood pressure is stable and at goal of less than 130/80 mmHg.  Continue low-dose lisinopril.      It was a pleasure seeing Ms. Vinita Fields in the office today. Return if symptoms worsen or fail to improve. Patient is aware to call the cardiology clinic with any questions or concerns.      Ren Marte MD, [facility]  Cardiologist, [facility], [facility]  [address] [address]  Phone: [phone]  Fax: [phone]    Please note that this dictation was created using voice recognition software. I have made every reasonable attempt to correct obvious errors, but it is possible there are errors of grammar and possibly content that I did not discover before finalizing the note.  "

## 2024-08-25 ENCOUNTER — APPOINTMENT (OUTPATIENT)
Dept: URGENT CARE | Facility: CLINIC | Age: 39
End: 2024-08-25
Payer: COMMERCIAL

## 2024-08-25 ENCOUNTER — APPOINTMENT (OUTPATIENT)
Dept: TELEHEALTH | Facility: TELEMEDICINE | Age: 39
End: 2024-08-25
Payer: COMMERCIAL

## 2024-08-27 ENCOUNTER — TELEPHONE (OUTPATIENT)
Dept: CARDIOLOGY | Facility: MEDICAL CENTER | Age: 39
End: 2024-08-27
Payer: COMMERCIAL

## 2024-08-27 NOTE — LETTER
PROCEDURE/SURGERY CLEARANCE FORM      Encounter Date: 8/27/2024    Patient: Vinita Fields  YOB: 1985    CARDIOLOGIST:  Ren LOZANO M.D.    REFERRING DOCTOR:  No ref. provider found    the following procedure/surgery:  Colonoscopy with deep propofol sedation                                            PROCEDURE/SURGERY CLEARANCE FORM    Date: 8/27/2024   Patient Name: Vinita Fields    Dear Surgeon or Proceduralist,      Thank you for your request for cardiac stratification of our mutual patient Vinita Fields 1985. We have reviewed their [facility] records; and to the best of our understanding this patient has not had stenting, ablation, cardiothoracic surgery or hospitalization for cardiovascular reasons in the past 6 months.  Vinita Fields has been seen within the past 18 months and is considered to have non-modifiable cardiac risk for this low-risk procedure/surgery. They may proceed from a cardiovascular standpoint and may hold their antiplatelet/anticoagulation as briefly as possible. Please have patient resume this medication when hemodynamically stable to do so.     Aspirin or Prasugrel   - hold 7 days prior to procedure/surgery, resume when hemodynamically stable      Clopidrogrel or Ticagrelor  - hold 7 days for all neurological procedures, hold 5 days prior to all other procedure/surgery,  resume when hemodynamically stable     Warfarin - hold 7 days for all neurological procedures, hold 5 days prior to all other procedure/surgery and coordinate with [facility] ([phone]) INR testing and dose management.      Pradaxa/Xarelto/Eliquis/Savesya - hold 1 day prior to procedure for low bleeding risk procedure, 2 days for high bleeding risk procedure, or consider holding 3 days or longer for patients with reduced kidney function (CrCl <30mL/min) or spinal/cranial surgeries/procedures.      If they have a mechanical heart  valve, please coordinate with [facility] ([phone]) the proper management of their anticoagulant in the periprocedural or perioperative period.      Some patients have higher risk for cardiovascular complications or holding medication. If our patient has had prior complications of holding antiplatelet or anticoagulants in the past and we have seen them after these events, we have addressed these concerns with the patient. They are at an unknown degree of increased risk for recurrent complication.  You may hold anticoagulation/antiplatelets for the procedure or surgery if the benefits of the procedure or surgery outweigh this nonmodifiable risk.      If Vinita Fields 1985 has new symptoms of heart failure decompensation, unstable arrythmia, or angina please reach out and we will assess the patient.      If you have other patient-specific concerns, please feel free to reach out to the patient's cardiologist directly at [phone].     Thank you,       [facility]       Electronic signature        MD Signature   Ren LOZANO M.D.

## 2024-08-27 NOTE — TELEPHONE ENCOUNTER
Last OV: 8.20.2024  Proposed Surgery: Colonoscopy with deep propofol sedation   Surgery Date: TBD  Requesting Office Name: Gastroenterology   Fax Number: [phone]  Preference of Location (default is surgery center unless specified by Cardiologist or NOE)  Prior Clearance Addressed: No      Anticoags/Antiplatelets: Aspirin  Anticoags/Antiplatelet managed by Cardiology? YES    Outstanding Cardiac Imaging : No  Stent, Cardiac Devices, or Catheterization: No  Ablation, TAVR/Valve (including open heart), Cardioversion: No  Recent Cardiac Hospitalization: No            When: N/A  History (cardiac history): No past medical history on file.          Surgical Clearance Letter Sent: YES   **Scan clearance request letter into [facility].**

## 2024-10-30 ENCOUNTER — HOSPITAL ENCOUNTER (OUTPATIENT)
Dept: RADIOLOGY | Facility: MEDICAL CENTER | Age: 39
End: 2024-10-30
Attending: NURSE PRACTITIONER
Payer: COMMERCIAL

## 2024-10-30 DIAGNOSIS — Z12.31 VISIT FOR SCREENING MAMMOGRAM: ICD-10-CM

## 2024-10-30 PROCEDURE — 77067 SCR MAMMO BI INCL CAD: CPT

## 2024-11-01 ENCOUNTER — HOSPITAL ENCOUNTER (OUTPATIENT)
Dept: RADIOLOGY | Facility: MEDICAL CENTER | Age: 39
End: 2024-11-01
Payer: COMMERCIAL

## 2024-11-05 ENCOUNTER — APPOINTMENT (RX ONLY)
Dept: URBAN - METROPOLITAN AREA CLINIC 20 | Facility: CLINIC | Age: 39
Setting detail: DERMATOLOGY
End: 2024-11-05

## 2024-11-05 DIAGNOSIS — D22 MELANOCYTIC NEVI: ICD-10-CM

## 2024-11-05 DIAGNOSIS — L82.1 OTHER SEBORRHEIC KERATOSIS: ICD-10-CM

## 2024-11-05 DIAGNOSIS — D18.0 HEMANGIOMA: ICD-10-CM

## 2024-11-05 DIAGNOSIS — L57.8 OTHER SKIN CHANGES DUE TO CHRONIC EXPOSURE TO NONIONIZING RADIATION: ICD-10-CM

## 2024-11-05 DIAGNOSIS — Z87.2 PERSONAL HISTORY OF DISEASES OF THE SKIN AND SUBCUTANEOUS TISSUE: ICD-10-CM

## 2024-11-05 DIAGNOSIS — L81.4 OTHER MELANIN HYPERPIGMENTATION: ICD-10-CM

## 2024-11-05 PROBLEM — D18.01 HEMANGIOMA OF SKIN AND SUBCUTANEOUS TISSUE: Status: ACTIVE | Noted: 2024-11-05

## 2024-11-05 PROBLEM — D48.5 NEOPLASM OF UNCERTAIN BEHAVIOR OF SKIN: Status: ACTIVE | Noted: 2024-11-05

## 2024-11-05 PROBLEM — D23.61 OTHER BENIGN NEOPLASM OF SKIN OF RIGHT UPPER LIMB, INCLUDING SHOULDER: Status: ACTIVE | Noted: 2024-11-05

## 2024-11-05 PROBLEM — D22.5 MELANOCYTIC NEVI OF TRUNK: Status: ACTIVE | Noted: 2024-11-05

## 2024-11-05 PROCEDURE — 11102 TANGNTL BX SKIN SINGLE LES: CPT

## 2024-11-05 PROCEDURE — ? COUNSELING

## 2024-11-05 PROCEDURE — ? BIOPSY BY SHAVE METHOD

## 2024-11-05 PROCEDURE — ? DIAGNOSIS COMMENT

## 2024-11-05 PROCEDURE — 99213 OFFICE O/P EST LOW 20 MIN: CPT | Mod: 25

## 2024-11-05 PROCEDURE — 11103 TANGNTL BX SKIN EA SEP/ADDL: CPT

## 2024-11-05 ASSESSMENT — LOCATION SIMPLE DESCRIPTION DERM
LOCATION SIMPLE: CHEST
LOCATION SIMPLE: RIGHT LOWER BACK
LOCATION SIMPLE: LEFT UPPER ARM
LOCATION SIMPLE: RIGHT UPPER BACK
LOCATION SIMPLE: LEFT FOREARM
LOCATION SIMPLE: RIGHT UPPER ARM
LOCATION SIMPLE: RIGHT CHEEK
LOCATION SIMPLE: RIGHT PRETIBIAL REGION
LOCATION SIMPLE: RIGHT THIGH
LOCATION SIMPLE: LEFT THIGH
LOCATION SIMPLE: LEFT PRETIBIAL REGION
LOCATION SIMPLE: LEFT UPPER BACK
LOCATION SIMPLE: LEFT CHEEK
LOCATION SIMPLE: LEFT BUTTOCK
LOCATION SIMPLE: RIGHT FOREARM

## 2024-11-05 ASSESSMENT — LOCATION DETAILED DESCRIPTION DERM
LOCATION DETAILED: RIGHT INFERIOR LATERAL LOWER BACK
LOCATION DETAILED: RIGHT ANTERIOR DISTAL THIGH
LOCATION DETAILED: LEFT ANTERIOR PROXIMAL UPPER ARM
LOCATION DETAILED: LEFT PROXIMAL PRETIBIAL REGION
LOCATION DETAILED: RIGHT DISTAL DORSAL FOREARM
LOCATION DETAILED: LEFT ANTERIOR DISTAL THIGH
LOCATION DETAILED: RIGHT INFERIOR UPPER BACK
LOCATION DETAILED: LEFT INFERIOR CENTRAL MALAR CHEEK
LOCATION DETAILED: RIGHT SUPERIOR MEDIAL UPPER BACK
LOCATION DETAILED: RIGHT ANTERIOR PROXIMAL THIGH
LOCATION DETAILED: RIGHT MID-UPPER BACK
LOCATION DETAILED: RIGHT ANTERIOR PROXIMAL UPPER ARM
LOCATION DETAILED: RIGHT PROXIMAL PRETIBIAL REGION
LOCATION DETAILED: LEFT INFERIOR LATERAL UPPER BACK
LOCATION DETAILED: RIGHT CENTRAL MALAR CHEEK
LOCATION DETAILED: LEFT POSTERIOR LATERAL DISTAL THIGH
LOCATION DETAILED: LEFT SUPERIOR UPPER BACK
LOCATION DETAILED: LEFT BUTTOCK
LOCATION DETAILED: LEFT MEDIAL SUPERIOR CHEST
LOCATION DETAILED: LEFT DISTAL DORSAL FOREARM

## 2024-11-05 ASSESSMENT — LOCATION ZONE DERM
LOCATION ZONE: LEG
LOCATION ZONE: ARM
LOCATION ZONE: FACE
LOCATION ZONE: TRUNK

## 2024-11-05 NOTE — PROCEDURE: DIAGNOSIS COMMENT
Render Risk Assessment In Note?: no
Comment: No evidence of reoccurrence, lesion has been excised.
Detail Level: Simple

## 2024-12-05 ENCOUNTER — APPOINTMENT (OUTPATIENT)
Dept: RADIOLOGY | Facility: MEDICAL CENTER | Age: 39
End: 2024-12-05
Attending: NURSE PRACTITIONER
Payer: COMMERCIAL

## 2024-12-11 ENCOUNTER — APPOINTMENT (OUTPATIENT)
Dept: RADIOLOGY | Facility: MEDICAL CENTER | Age: 39
End: 2024-12-11
Attending: NURSE PRACTITIONER
Payer: COMMERCIAL

## 2024-12-11 DIAGNOSIS — I10 ESSENTIAL HYPERTENSION, MALIGNANT: ICD-10-CM

## 2024-12-11 DIAGNOSIS — R73.03 PRE-DIABETES: ICD-10-CM

## 2024-12-11 DIAGNOSIS — N18.2 CHRONIC KIDNEY DISEASE, STAGE II (MILD): ICD-10-CM

## 2024-12-11 PROCEDURE — 76775 US EXAM ABDO BACK WALL LIM: CPT

## 2024-12-18 ENCOUNTER — HOSPITAL ENCOUNTER (OUTPATIENT)
Facility: MEDICAL CENTER | Age: 39
End: 2024-12-18
Attending: OBSTETRICS & GYNECOLOGY
Payer: COMMERCIAL

## 2024-12-18 PROCEDURE — 87625 HPV TYPES 16 & 18 ONLY: CPT

## 2024-12-18 PROCEDURE — 87624 HPV HI-RISK TYP POOLED RSLT: CPT

## 2024-12-18 PROCEDURE — 88142 CYTOPATH C/V THIN LAYER: CPT

## 2024-12-24 LAB
HPV I/H RISK 1 DNA SPEC QL NAA+PROBE: DETECTED
SPECIMEN SOURCE: ABNORMAL
THINPREP PAP, CYTOLOGY NL11781: NORMAL

## 2024-12-26 LAB
HPV16 DNA CVX QL PROBE+SIG AMP: DETECTED
HPV18 DNA CVX QL PROBE+SIG AMP: NOT DETECTED
SPECIMEN SOURCE: ABNORMAL

## 2025-01-19 ENCOUNTER — OFFICE VISIT (OUTPATIENT)
Dept: URGENT CARE | Facility: CLINIC | Age: 40
End: 2025-01-19
Payer: COMMERCIAL

## 2025-01-19 VITALS
TEMPERATURE: 96 F | DIASTOLIC BLOOD PRESSURE: 68 MMHG | SYSTOLIC BLOOD PRESSURE: 114 MMHG | HEART RATE: 120 BPM | HEIGHT: 66 IN | BODY MASS INDEX: 32.95 KG/M2 | OXYGEN SATURATION: 97 % | WEIGHT: 205 LBS | RESPIRATION RATE: 16 BRPM

## 2025-01-19 DIAGNOSIS — R05.1 ACUTE COUGH: ICD-10-CM

## 2025-01-19 DIAGNOSIS — R00.0 TACHYCARDIA: ICD-10-CM

## 2025-01-19 DIAGNOSIS — R68.89 FLU-LIKE SYMPTOMS: ICD-10-CM

## 2025-01-19 LAB
FLUAV RNA SPEC QL NAA+PROBE: NEGATIVE
FLUBV RNA SPEC QL NAA+PROBE: NEGATIVE
RSV RNA SPEC QL NAA+PROBE: NEGATIVE
SARS-COV-2 RNA RESP QL NAA+PROBE: NEGATIVE

## 2025-01-19 PROCEDURE — 3074F SYST BP LT 130 MM HG: CPT

## 2025-01-19 PROCEDURE — 0241U POCT CEPHEID COV-2, FLU A/B, RSV - PCR: CPT

## 2025-01-19 PROCEDURE — 93000 ELECTROCARDIOGRAM COMPLETE: CPT

## 2025-01-19 PROCEDURE — 99213 OFFICE O/P EST LOW 20 MIN: CPT

## 2025-01-19 PROCEDURE — 3078F DIAST BP <80 MM HG: CPT

## 2025-01-19 RX ORDER — BUSPIRONE HYDROCHLORIDE 10 MG/1
10 TABLET ORAL 2 TIMES DAILY
COMMUNITY
Start: 2024-10-29

## 2025-01-19 RX ORDER — POLYETHYLENE GLYCOL-3350 AND ELECTROLYTES 236; 6.74; 5.86; 2.97; 22.74 G/274.31G; G/274.31G; G/274.31G; G/274.31G; G/274.31G
POWDER, FOR SOLUTION ORAL
COMMUNITY
Start: 2024-11-04

## 2025-01-19 RX ORDER — ESCITALOPRAM OXALATE 10 MG/1
TABLET ORAL
COMMUNITY
Start: 2024-10-24

## 2025-01-19 RX ORDER — DEXTROMETHORPHAN HYDROBROMIDE AND PROMETHAZINE HYDROCHLORIDE 15; 6.25 MG/5ML; MG/5ML
5 SYRUP ORAL EVERY 6 HOURS PRN
Qty: 100 ML | Refills: 0 | Status: SHIPPED | OUTPATIENT
Start: 2025-01-19 | End: 2025-01-19

## 2025-01-19 RX ORDER — BENZONATATE 100 MG/1
100 CAPSULE ORAL 3 TIMES DAILY PRN
Qty: 60 CAPSULE | Refills: 0 | Status: SHIPPED | OUTPATIENT
Start: 2025-01-19 | End: 2025-01-19

## 2025-01-19 RX ORDER — B-COMPLEX WITH VITAMIN C
100 TABLET ORAL NIGHTLY
COMMUNITY

## 2025-01-19 ASSESSMENT — ENCOUNTER SYMPTOMS
NAUSEA: 0
BLURRED VISION: 0
TINGLING: 0
SHORTNESS OF BREATH: 0
FEVER: 1
DIARRHEA: 0
CHILLS: 0
DOUBLE VISION: 0
DIZZINESS: 0
WEAKNESS: 0
MYALGIAS: 0
HEADACHES: 0
COUGH: 1
VOMITING: 0
SORE THROAT: 0
PALPITATIONS: 0

## 2025-01-19 NOTE — PROGRESS NOTES
Subjective:   Vinita Fields is a 39 y.o. female who presents for Fever (Fever of 103 x 1 day)    Patient presents to the clinic for complaints of fever and mild cough with fatigue x 2 days.  Fever tmax of 104F.   Cough is mild and dry, non productive.  Has taken Tylenol, which broke her fever this morning.  Daughter has been sick.  Patient denies chest pain, palpitations/racing heart rate, SOB, sore throat, dizziness/lightheadednss, N/V/D, or weakness.  Patient denies any cardiac history.    Fever   Associated symptoms include coughing. Pertinent negatives include no chest pain, congestion, diarrhea, ear pain, headaches, nausea, sore throat, urinary pain or vomiting.       Review of Systems   Constitutional:  Positive for fever and malaise/fatigue. Negative for chills.   HENT:  Negative for congestion, ear pain and sore throat.    Eyes:  Negative for blurred vision and double vision.   Respiratory:  Positive for cough. Negative for shortness of breath.    Cardiovascular:  Negative for chest pain and palpitations.   Gastrointestinal:  Negative for diarrhea, nausea and vomiting.   Genitourinary:  Negative for dysuria.   Musculoskeletal:  Negative for myalgias.   Neurological:  Negative for dizziness, tingling, weakness and headaches.   All other systems reviewed and are negative.    Refer to HPI for additional details.    During this visit, appropriate PPE was worn, and hand hygiene was performed.    PMH:  has no past medical history on file.    MEDS:   Current Outpatient Medications:     busPIRone (BUSPAR) 10 MG Tab tablet, Take 10 mg by mouth 2 times a day. FOR 15 DAYS, Disp: , Rfl:     escitalopram (LEXAPRO) 10 MG Tab, , Disp: , Rfl:     cetirizine (ZYRTEC) 10 MG Tab, Take 10 mg by mouth every day., Disp: , Rfl:     lisinopril (PRINIVIL) 10 MG Tab, Take 1 Tablet by mouth every day., Disp: , Rfl:     aspirin (ASA) 81 MG Chew Tab chewable tablet, Chew 81 mg every day., Disp: , Rfl:     multivitamin Tab,  "Take 1 Tablet by mouth every day., Disp: , Rfl:     valACYclovir (VALTREX) 500 MG Tab, , Disp: , Rfl:     niacin 50 MG tablet, Take 100 mg by mouth every evening. (Patient not taking: Reported on 2025), Disp: , Rfl:     Nifurtimox 30 MG Tab, Take  by mouth. (Patient not taking: Reported on 2025), Disp: , Rfl:     GAVILYTE-G 236 g Recon Soln, FOLLOW [facility] INSTRUCTION HANDOUT: OK TO [facility] PREFERR[facility] (Patient not taking: Reported on 2025), Disp: , Rfl:     ALLERGIES:   Allergies   Allergen Reactions    Charentais Melon (Japanese Melon) Hives, Itching, Rash, Shortness of Breath and Swelling     SURGHX:   Past Surgical History:   Procedure Laterality Date    PRIMARY C SECTION N/A 2022    Procedure:  SECTION, PRIMARY;  Surgeon: Stephanie Mauricio M.D.;  [facility];  Service: Labor and Delivery     SOCHX:  reports that she quit smoking about 15 years ago. Her smoking use included cigarettes. She has never used smokeless tobacco. She reports current alcohol use. She reports that she does not use drugs.    FH: Per HPI as applicable/pertinent.    Medications, Allergies, and current problem list reviewed today in Epic.     Objective:     /68 (BP Location: Left arm, Patient Position: Sitting, BP Cuff Size: Adult)   Pulse (!) 120   Temp (!) 35.6 °C (96 °F) (Temporal)   Resp 16   Ht 1.676 m (5' 6\")   Wt 93 kg (205 lb)   SpO2 97%     Physical Exam  Constitutional:       Appearance: Normal appearance. She is not ill-appearing or toxic-appearing.   HENT:      Head: Normocephalic.      Right Ear: Tympanic membrane, ear canal and external ear normal.      Left Ear: Tympanic membrane, ear canal and external ear normal.      Nose: Nose normal. No congestion or rhinorrhea.      Mouth/Throat:      Mouth: Mucous membranes are moist.      Pharynx: Oropharynx is clear. No oropharyngeal exudate or posterior oropharyngeal erythema.   Eyes:      General:    "      Right eye: No discharge.         Left eye: No discharge.      Extraocular Movements: Extraocular movements intact.      Conjunctiva/sclera: Conjunctivae normal.      Pupils: Pupils are equal, round, and reactive to light.   Cardiovascular:      Rate and Rhythm: Regular rhythm. Tachycardia present.      Pulses: Normal pulses.      Heart sounds: Normal heart sounds. No murmur heard.  Pulmonary:      Effort: Pulmonary effort is normal. No respiratory distress.      Breath sounds: Normal breath sounds. No wheezing or rhonchi.   Abdominal:      General: Abdomen is flat.   Musculoskeletal:         General: No signs of injury. Normal range of motion.      Cervical back: Normal range of motion. No rigidity.   Lymphadenopathy:      Cervical: No cervical adenopathy.   Skin:     General: Skin is warm and dry.   Neurological:      General: No focal deficit present.      Mental Status: She is alert and oriented to person, place, and time.      Motor: No weakness.       EKG Interpretation-HR is 102 sinus tachycardia        Assessment/Plan:     Diagnosis and associated orders:     1. Tachycardia  - EKG - Clinic Performed  - Referral back to PCP    2. Flu-like symptoms  - POCT CEPHEID COV-2, FLU A/B, RSV - PCR    3. Acute cough    Other orders  - busPIRone (BUSPAR) 10 MG Tab tablet; Take 10 mg by mouth 2 times a day. FOR 15 DAYS  - escitalopram (LEXAPRO) 10 MG Tab  - niacin 50 MG tablet; Take 100 mg by mouth every evening. (Patient not taking: Reported on 1/19/2025)  - Nifurtimox 30 MG Tab; Take  by mouth. (Patient not taking: Reported on 1/19/2025)  - GAVILYTE-G 236 g Recon Soln; FOLLOW [facility] INSTRUCTION HANDOUT: OK TO [facility] PREFERR[facility] (Patient not taking: Reported on 1/19/2025)     Comments/MDM:     Discussed that likely etiology of the patient's symptoms is viral URI.  POC viral swab collected in clinic. Discussed that they will receive a phone call or [employer] message from this provider regarding  the results of the tests along with any changes with medication or treatment plan as appropriate.  EKG performed in clinic due to patient's regular rhythm tachycardia on exam, which showed borderline sinus tachycardia at 102 bpm.  Likely secondary to systemic effects of current URI.  Referral back to PCP placed for further managing and workup of the patient's tachycardia.  Patient without feeling of racing heart rate, chest pain, shortness of breath, palpitations, chest pressure, nausea/vomiting/diarrhea, weakness, fatigue, or irregular rhythm.  Low likelihood of acute cardiac mortality/morbidity.  Safe for discharge at this time.  Patient agreeable to this plan of care.  All questions answered.  Return to clinic if symptoms persist or worsen.  Red flag symptoms warranting emergency medical services discussed, including but not limited to chest pain, SOB, fever greater than 103F despite medication management, chest pressure, racing heart rate, palpitations, pounding in the chest, weakness, difficulty breathing or swallowing, pain to the jaw, back, abdomen, or arm, diaphoresis/sweating, or numbness/tingling.         Differential diagnosis, natural history, supportive care, and indications for immediate follow-up discussed.    Advised the patient to follow-up with the primary care physician for recheck, reevaluation, and consideration of further management.    Instructed patient to seek emergency medical attention via calling EMS or going to the Emergency Room for red flag symptoms, including but not limited to: chest pain, palpitations, fever greater than 103F, shortness of breath, wheezing, new or worsened numbness/tingling, focal or unilateral weakness, and bloody vomit/stool.     Please note that this dictation was created using voice recognition software. I have made a reasonable attempt to correct obvious errors, but I expect that there are errors of grammar and possibly content that I did not discover before  finalizing the note.    This note was electronically signed by DAVID Scott

## 2025-01-19 NOTE — RESULT ENCOUNTER NOTE
Sent patient MyChart message regarding her negative viral swab and to continue the plan of care those discussed in the clinic, as well as to reach out for any further questions regarding the visit.

## 2025-01-30 ENCOUNTER — HOSPITAL ENCOUNTER (OUTPATIENT)
Facility: MEDICAL CENTER | Age: 40
End: 2025-01-30
Attending: OBSTETRICS & GYNECOLOGY
Payer: COMMERCIAL

## 2025-01-30 LAB — PATHOLOGY CONSULT NOTE: NORMAL

## 2025-03-14 ENCOUNTER — OFFICE VISIT (OUTPATIENT)
Dept: URGENT CARE | Facility: CLINIC | Age: 40
End: 2025-03-14

## 2025-03-14 VITALS
RESPIRATION RATE: 15 BRPM | HEART RATE: 107 BPM | OXYGEN SATURATION: 99 % | DIASTOLIC BLOOD PRESSURE: 78 MMHG | BODY MASS INDEX: 31.86 KG/M2 | HEIGHT: 67 IN | WEIGHT: 203 LBS | SYSTOLIC BLOOD PRESSURE: 126 MMHG | TEMPERATURE: 98.1 F

## 2025-03-14 DIAGNOSIS — B96.89 ACUTE BACTERIAL SINUSITIS: ICD-10-CM

## 2025-03-14 DIAGNOSIS — J01.90 ACUTE BACTERIAL SINUSITIS: ICD-10-CM

## 2025-03-14 PROCEDURE — 3074F SYST BP LT 130 MM HG: CPT

## 2025-03-14 PROCEDURE — 99213 OFFICE O/P EST LOW 20 MIN: CPT

## 2025-03-14 PROCEDURE — 3078F DIAST BP <80 MM HG: CPT

## 2025-03-14 RX ORDER — FLUTICASONE PROPIONATE 50 MCG
1 SPRAY, SUSPENSION (ML) NASAL 2 TIMES DAILY
Qty: 16 G | Refills: 0 | Status: SHIPPED | OUTPATIENT
Start: 2025-03-14

## 2025-03-16 ASSESSMENT — ENCOUNTER SYMPTOMS
SORE THROAT: 1
DIARRHEA: 0
ABDOMINAL PAIN: 0
SHORTNESS OF BREATH: 0
NAUSEA: 0
CHILLS: 0
SINUS PAIN: 1
VOMITING: 0
COUGH: 1
FEVER: 0
MYALGIAS: 0
HEADACHES: 0

## 2025-03-16 NOTE — PROGRESS NOTES
"Subjective:   Vinita Fields is a 40 y.o. female who presents for Sinus Problem (Congestion, dis color mucous)      Sinus Problem  This is a new problem. The current episode started 1 to 4 weeks ago. The problem has been gradually worsening since onset. Associated symptoms include congestion, coughing and a sore throat. Pertinent negatives include no chills, ear pain, headaches or shortness of breath.       Review of Systems   Constitutional:  Positive for malaise/fatigue. Negative for chills and fever.   HENT:  Positive for congestion, sinus pain and sore throat. Negative for ear pain and hearing loss.    Respiratory:  Positive for cough. Negative for shortness of breath.    Cardiovascular:  Negative for chest pain.   Gastrointestinal:  Negative for abdominal pain, diarrhea, nausea and vomiting.   Genitourinary:  Negative for dysuria.   Musculoskeletal:  Negative for myalgias.   Skin:  Negative for rash.   Neurological:  Negative for headaches.       Medications, Allergies, and current problem list reviewed today in Epic.     Objective:     /78 (BP Location: Left arm, Patient Position: Sitting, BP Cuff Size: Small adult)   Pulse (!) 107   Temp 36.7 °C (98.1 °F) (Temporal)   Resp 15   Ht 1.702 m (5' 7\")   Wt 92.1 kg (203 lb)   SpO2 99%     Physical Exam  Vitals and nursing note reviewed.   Constitutional:       General: She is not in acute distress.     Appearance: Normal appearance. She is ill-appearing.   HENT:      Head: Normocephalic and atraumatic.      Right Ear: Tympanic membrane normal.      Left Ear: Tympanic membrane normal.      Nose: Congestion present.      Mouth/Throat:      Mouth: Mucous membranes are moist.      Pharynx: No oropharyngeal exudate or posterior oropharyngeal erythema.   Eyes:      Conjunctiva/sclera: Conjunctivae normal.   Cardiovascular:      Rate and Rhythm: Normal rate.      Heart sounds: Normal heart sounds.   Pulmonary:      Effort: Pulmonary effort is " normal. No respiratory distress.      Breath sounds: No stridor. No wheezing or rhonchi.   Abdominal:      General: Abdomen is flat.      Palpations: Abdomen is soft.   Musculoskeletal:         General: Normal range of motion.      Cervical back: Normal range of motion.   Skin:     General: Skin is warm and dry.      Capillary Refill: Capillary refill takes less than 2 seconds.   Neurological:      Mental Status: She is alert and oriented to person, place, and time.   Psychiatric:         Mood and Affect: Mood normal.         Behavior: Behavior normal.         Assessment/Plan:       1. Acute bacterial sinusitis  amoxicillin-clavulanate (AUGMENTIN) 875-125 MG Tab    fluticasone (FLONASE) 50 MCG/ACT nasal spray        After assessment it does appear the patient has acute sinusitis.  At this time patient was provided course of Augmentin and Flonase.  Patient instructed take as prescribed.  Patient instructed to monitor for any worsening signs and symptoms of any other concerns she was instructed return to urgent care for reevaluation.    Take full course of antibiotic  Tylenol and Motrin for fever and pain  OTC meds as discussed including oral decongestant if tolerated  Increase fluids and rest  Nasal spray, nasal rinse/wash, russ pot    Differential diagnosis, natural history, and supportive care discussed. We also reviewed side effects of medication including allergic response, GI upset, tendon injury, rash, sedation etc. Patient and/or guardian voices understanding.      Advised the patient to follow-up with the primary care physician for recheck, reevaluation, and consideration of further management.    I personally reviewed prior external notes and test results pertinent to today's visit as well as additional imaging and testing completed in clinic today.     Please note that this dictation was created using voice recognition software. I have made every reasonable attempt to correct obvious errors, but I expect  that there are errors of grammar and possibly content that I did not discover before finalizing the note.    This note was electronically signed by DAVID Best

## 2025-03-31 ENCOUNTER — HOSPITAL ENCOUNTER (OUTPATIENT)
Facility: MEDICAL CENTER | Age: 40
End: 2025-03-31
Attending: OBSTETRICS & GYNECOLOGY
Payer: COMMERCIAL

## 2025-03-31 LAB
BASOPHILS # BLD AUTO: 0.6 % (ref 0–1.8)
BASOPHILS # BLD: 0.03 K/UL (ref 0–0.12)
EOSINOPHIL # BLD AUTO: 0.08 K/UL (ref 0–0.51)
EOSINOPHIL NFR BLD: 1.7 % (ref 0–6.9)
ERYTHROCYTE [DISTWIDTH] IN BLOOD BY AUTOMATED COUNT: 43.6 FL (ref 35.9–50)
HCT VFR BLD AUTO: 40.9 % (ref 37–47)
HGB BLD-MCNC: 13.8 G/DL (ref 12–16)
IMM GRANULOCYTES # BLD AUTO: 0.01 K/UL (ref 0–0.11)
IMM GRANULOCYTES NFR BLD AUTO: 0.2 % (ref 0–0.9)
LYMPHOCYTES # BLD AUTO: 1.47 K/UL (ref 1–4.8)
LYMPHOCYTES NFR BLD: 30.6 % (ref 22–41)
MCH RBC QN AUTO: 30.3 PG (ref 27–33)
MCHC RBC AUTO-ENTMCNC: 33.7 G/DL (ref 32.2–35.5)
MCV RBC AUTO: 89.9 FL (ref 81.4–97.8)
MONOCYTES # BLD AUTO: 0.3 K/UL (ref 0–0.85)
MONOCYTES NFR BLD AUTO: 6.2 % (ref 0–13.4)
NEUTROPHILS # BLD AUTO: 2.92 K/UL (ref 1.82–7.42)
NEUTROPHILS NFR BLD: 60.7 % (ref 44–72)
NRBC # BLD AUTO: 0 K/UL
NRBC BLD-RTO: 0 /100 WBC (ref 0–0.2)
PLATELET # BLD AUTO: 235 K/UL (ref 164–446)
PMV BLD AUTO: 11 FL (ref 9–12.9)
RBC # BLD AUTO: 4.55 M/UL (ref 4.2–5.4)
T4 FREE SERPL-MCNC: 0.9 NG/DL (ref 0.93–1.7)
TSH SERPL-ACNC: 2.6 UIU/ML (ref 0.35–5.5)
WBC # BLD AUTO: 4.8 K/UL (ref 4.8–10.8)

## 2025-03-31 PROCEDURE — 85025 COMPLETE CBC W/AUTO DIFF WBC: CPT

## 2025-03-31 PROCEDURE — 84443 ASSAY THYROID STIM HORMONE: CPT

## 2025-03-31 PROCEDURE — 36415 COLL VENOUS BLD VENIPUNCTURE: CPT

## 2025-03-31 PROCEDURE — 84439 ASSAY OF FREE THYROXINE: CPT

## 2025-04-24 ENCOUNTER — APPOINTMENT (OUTPATIENT)
Dept: RADIOLOGY | Facility: MEDICAL CENTER | Age: 40
End: 2025-04-24
Attending: OBSTETRICS & GYNECOLOGY
Payer: COMMERCIAL

## 2025-04-24 DIAGNOSIS — N93.9 HEMORRHAGE IN UTERUS: ICD-10-CM

## 2025-04-24 PROCEDURE — 76830 TRANSVAGINAL US NON-OB: CPT

## 2025-05-07 ENCOUNTER — HOSPITAL ENCOUNTER (OUTPATIENT)
Facility: MEDICAL CENTER | Age: 40
End: 2025-05-07
Attending: OBSTETRICS & GYNECOLOGY
Payer: COMMERCIAL

## 2025-05-07 LAB — PATHOLOGY CONSULT NOTE: NORMAL

## 2025-05-07 PROCEDURE — 88305 TISSUE EXAM BY PATHOLOGIST: CPT | Performed by: PATHOLOGY

## 2025-05-07 PROCEDURE — 88305 TISSUE EXAM BY PATHOLOGIST: CPT | Mod: 26 | Performed by: PATHOLOGY

## 2025-07-14 ENCOUNTER — HOSPITAL ENCOUNTER (OUTPATIENT)
Facility: MEDICAL CENTER | Age: 40
End: 2025-07-14
Attending: NURSE PRACTITIONER
Payer: COMMERCIAL

## 2025-07-14 LAB
ALBUMIN SERPL BCP-MCNC: 4.1 G/DL (ref 3.2–4.9)
ALBUMIN/GLOB SERPL: 1.3 G/DL
ALP SERPL-CCNC: 40 U/L (ref 30–99)
ALT SERPL-CCNC: 24 U/L (ref 2–50)
ANION GAP SERPL CALC-SCNC: 11 MMOL/L (ref 7–16)
AST SERPL-CCNC: 32 U/L (ref 12–45)
BASOPHILS # BLD AUTO: 0.7 % (ref 0–1.8)
BASOPHILS # BLD: 0.03 K/UL (ref 0–0.12)
BILIRUB SERPL-MCNC: 0.4 MG/DL (ref 0.1–1.5)
BUN SERPL-MCNC: 18 MG/DL (ref 8–22)
CALCIUM ALBUM COR SERPL-MCNC: 8.7 MG/DL (ref 8.5–10.5)
CALCIUM SERPL-MCNC: 8.8 MG/DL (ref 8.5–10.5)
CHLORIDE SERPL-SCNC: 101 MMOL/L (ref 96–112)
CHOLEST SERPL-MCNC: 287 MG/DL (ref 100–199)
CO2 SERPL-SCNC: 22 MMOL/L (ref 20–33)
CREAT SERPL-MCNC: 1.01 MG/DL (ref 0.5–1.4)
EOSINOPHIL # BLD AUTO: 0.09 K/UL (ref 0–0.51)
EOSINOPHIL NFR BLD: 2.1 % (ref 0–6.9)
ERYTHROCYTE [DISTWIDTH] IN BLOOD BY AUTOMATED COUNT: 47.5 FL (ref 35.9–50)
EST. AVERAGE GLUCOSE BLD GHB EST-MCNC: 120 MG/DL
GFR SERPLBLD CREATININE-BSD FMLA CKD-EPI: 72 ML/MIN/1.73 M 2
GLOBULIN SER CALC-MCNC: 3.2 G/DL (ref 1.9–3.5)
GLUCOSE SERPL-MCNC: 84 MG/DL (ref 65–99)
HBA1C MFR BLD: 5.8 % (ref 4–5.6)
HCT VFR BLD AUTO: 45.2 % (ref 37–47)
HDLC SERPL-MCNC: 67 MG/DL
HGB BLD-MCNC: 14.5 G/DL (ref 12–16)
IMM GRANULOCYTES # BLD AUTO: 0.01 K/UL (ref 0–0.11)
IMM GRANULOCYTES NFR BLD AUTO: 0.2 % (ref 0–0.9)
LDLC SERPL CALC-MCNC: 178 MG/DL
LYMPHOCYTES # BLD AUTO: 1.49 K/UL (ref 1–4.8)
LYMPHOCYTES NFR BLD: 35.2 % (ref 22–41)
MCH RBC QN AUTO: 28.9 PG (ref 27–33)
MCHC RBC AUTO-ENTMCNC: 32.1 G/DL (ref 32.2–35.5)
MCV RBC AUTO: 90.2 FL (ref 81.4–97.8)
MONOCYTES # BLD AUTO: 0.41 K/UL (ref 0–0.85)
MONOCYTES NFR BLD AUTO: 9.7 % (ref 0–13.4)
NEUTROPHILS # BLD AUTO: 2.2 K/UL (ref 1.82–7.42)
NEUTROPHILS NFR BLD: 52.1 % (ref 44–72)
NRBC # BLD AUTO: 0 K/UL
NRBC BLD-RTO: 0 /100 WBC (ref 0–0.2)
PLATELET # BLD AUTO: 271 K/UL (ref 164–446)
PMV BLD AUTO: 11.3 FL (ref 9–12.9)
POTASSIUM SERPL-SCNC: 4.5 MMOL/L (ref 3.6–5.5)
PROT SERPL-MCNC: 7.3 G/DL (ref 6–8.2)
RBC # BLD AUTO: 5.01 M/UL (ref 4.2–5.4)
SODIUM SERPL-SCNC: 134 MMOL/L (ref 135–145)
TRIGL SERPL-MCNC: 212 MG/DL (ref 0–149)
WBC # BLD AUTO: 4.2 K/UL (ref 4.8–10.8)

## 2025-07-14 PROCEDURE — 83036 HEMOGLOBIN GLYCOSYLATED A1C: CPT

## 2025-07-14 PROCEDURE — 85025 COMPLETE CBC W/AUTO DIFF WBC: CPT

## 2025-07-14 PROCEDURE — 80053 COMPREHEN METABOLIC PANEL: CPT

## 2025-07-14 PROCEDURE — 36415 COLL VENOUS BLD VENIPUNCTURE: CPT

## 2025-07-14 PROCEDURE — 80061 LIPID PANEL: CPT

## 2025-07-15 ENCOUNTER — APPOINTMENT (OUTPATIENT)
Facility: MEDICAL CENTER | Age: 40
End: 2025-07-15
Payer: COMMERCIAL

## 2025-08-06 ENCOUNTER — HOSPITAL ENCOUNTER (OUTPATIENT)
Facility: MEDICAL CENTER | Age: 40
End: 2025-08-06
Attending: OBSTETRICS & GYNECOLOGY
Payer: COMMERCIAL

## 2025-08-06 PROCEDURE — 87624 HPV HI-RISK TYP POOLED RSLT: CPT

## 2025-08-06 PROCEDURE — 88175 CYTOPATH C/V AUTO FLUID REDO: CPT

## 2025-08-11 LAB
HPV I/H RISK 1 DNA SPEC QL NAA+PROBE: NOT DETECTED
SPECIMEN SOURCE: NORMAL
THINPREP PAP, CYTOLOGY NL11781: NORMAL

## (undated) DEVICE — KIT  I.V. START (100EA/CA)

## (undated) DEVICE — GLOVE BIOGEL SZ 6.5 SURGICAL PF LTX (50PR/BX 4BX/CA)

## (undated) DEVICE — WATER IRRIGATION STERILE 1000ML (12EA/CA)

## (undated) DEVICE — HEAD HOLDER JUNIOR/ADULT

## (undated) DEVICE — CATHETER IV NON-SAFETY 18 GA X 1 1/4 (50/BX 4BX/CA)

## (undated) DEVICE — ELECTRODE DUAL RETURN W/ CORD - (50/PK)

## (undated) DEVICE — SUTURE 0 VICRYL PLUS CT-1 - 36 INCH (36/BX)

## (undated) DEVICE — SODIUM CHL IRRIGATION 0.9% 1000ML (12EA/CA)

## (undated) DEVICE — TRAY SPINAL ANESTHESIA NON-SAFETY (10/CA)

## (undated) DEVICE — SWABSTICK BENZOIN SINGLE (50EA/BX)

## (undated) DEVICE — SUTURE 4-0 VICRYL PLUSFS-1 - 27 INCH (36/BX)

## (undated) DEVICE — SLEEVE, SEQUENTIAL CALF REG

## (undated) DEVICE — CLOSURE SKIN STRIP 1/2 X 4 IN - (STERI STRIP) (50/BX 4BX/CA)

## (undated) DEVICE — SUTURE 3-0 VICRYL PLUS CT-1 - 36 INCH (36/BX)

## (undated) DEVICE — TUBING CLEARLINK DUO-VENT - C-FLO (48EA/CA)

## (undated) DEVICE — SET EXTENSION WITH 2 PORTS (48EA/CA) ***PART #2C8610 IS A SUBSTITUTE*****

## (undated) DEVICE — SUTURE 2-0 VICRYL PLUS CT-1 36 (36PK/BX)"

## (undated) DEVICE — SUTURE 3-0 VICRYL PLUS CT-1 - 8 X 18 INCH (12/BX)

## (undated) DEVICE — PACK C-SECTION (2EA/CA)